# Patient Record
Sex: FEMALE | Race: WHITE | NOT HISPANIC OR LATINO | URBAN - METROPOLITAN AREA
[De-identification: names, ages, dates, MRNs, and addresses within clinical notes are randomized per-mention and may not be internally consistent; named-entity substitution may affect disease eponyms.]

---

## 2017-06-12 ENCOUNTER — EMERGENCY (EMERGENCY)
Facility: HOSPITAL | Age: 82
LOS: 0 days | Discharge: HOME | End: 2017-06-12

## 2017-06-28 DIAGNOSIS — R11.0 NAUSEA: ICD-10-CM

## 2017-06-28 DIAGNOSIS — R63.0 ANOREXIA: ICD-10-CM

## 2017-06-28 DIAGNOSIS — R42 DIZZINESS AND GIDDINESS: ICD-10-CM

## 2017-06-28 DIAGNOSIS — Z79.899 OTHER LONG TERM (CURRENT) DRUG THERAPY: ICD-10-CM

## 2018-09-21 ENCOUNTER — OUTPATIENT (OUTPATIENT)
Dept: OUTPATIENT SERVICES | Facility: HOSPITAL | Age: 83
LOS: 1 days | Discharge: HOME | End: 2018-09-21

## 2018-09-21 DIAGNOSIS — E78.5 HYPERLIPIDEMIA, UNSPECIFIED: ICD-10-CM

## 2018-09-21 DIAGNOSIS — E03.9 HYPOTHYROIDISM, UNSPECIFIED: ICD-10-CM

## 2018-09-21 DIAGNOSIS — E11.65 TYPE 2 DIABETES MELLITUS WITH HYPERGLYCEMIA: ICD-10-CM

## 2019-01-03 ENCOUNTER — EMERGENCY (EMERGENCY)
Facility: HOSPITAL | Age: 84
LOS: 0 days | Discharge: HOME | End: 2019-01-03
Attending: EMERGENCY MEDICINE | Admitting: FAMILY MEDICINE

## 2019-01-03 VITALS
HEART RATE: 77 BPM | SYSTOLIC BLOOD PRESSURE: 186 MMHG | DIASTOLIC BLOOD PRESSURE: 96 MMHG | TEMPERATURE: 96 F | HEIGHT: 59 IN | RESPIRATION RATE: 18 BRPM | WEIGHT: 125 LBS | OXYGEN SATURATION: 96 %

## 2019-01-03 DIAGNOSIS — Z02.9 ENCOUNTER FOR ADMINISTRATIVE EXAMINATIONS, UNSPECIFIED: ICD-10-CM

## 2019-01-03 RX ORDER — IBUPROFEN 200 MG
600 TABLET ORAL ONCE
Qty: 0 | Refills: 0 | Status: COMPLETED | OUTPATIENT
Start: 2019-01-03 | End: 2019-01-03

## 2019-01-03 RX ORDER — ASPIRIN/CALCIUM CARB/MAGNESIUM 324 MG
1 TABLET ORAL
Qty: 0 | Refills: 0 | COMMUNITY

## 2019-01-03 RX ORDER — SIMVASTATIN 20 MG/1
1 TABLET, FILM COATED ORAL
Qty: 0 | Refills: 0 | COMMUNITY

## 2019-01-03 RX ADMIN — Medication 600 MILLIGRAM(S): at 16:39

## 2019-01-03 NOTE — ED PROVIDER NOTE - MEDICAL DECISION MAKING DETAILS
Pathologic fracture. Images reviewed with opt and family, splint placed.  Per family patient had abnml mammo, she did not want to pursue work up.  She has appt with Ortho and will keep appt.

## 2019-01-03 NOTE — ED ADULT NURSE NOTE - NSIMPLEMENTINTERV_GEN_ALL_ED
Implemented All Fall with Harm Risk Interventions:  [place] to call system. Call bell, personal items and telephone within reach. Instruct patient to call for assistance. Room bathroom lighting operational. Non-slip footwear when patient is off stretcher. Physically safe environment: no spills, clutter or unnecessary equipment. Stretcher in lowest position, wheels locked, appropriate side rails in place. Provide visual cue, wrist band, yellow gown, etc. Monitor gait and stability. Monitor for mental status changes and reorient to person, place, and time. Review medications for side effects contributing to fall risk. Reinforce activity limits and safety measures with patient and family. Provide visual clues: red socks.

## 2019-01-03 NOTE — ED PROVIDER NOTE - ATTENDING CONTRIBUTION TO CARE
89 yo F PMHx noted presents with family with c/o left arm pain x weeks.  Pt has x ray by PMD (family has report) that reveal lytic lesion to humerus.  Today pain is worse and patient with difficulty with moving arm.  On exam pt in NAD AAO x 3, + tender with decreased ROM left arm, no skin changes, + distal pulses

## 2019-01-03 NOTE — ED PROVIDER NOTE - CARE PROVIDER_API CALL
Leland Valero (MD), Orthopaedic Surgery  [facility] [place] [address]  Phone: [phone]  Fax: [phone]

## 2019-01-03 NOTE — ED PROVIDER NOTE - OBJECTIVE STATEMENT
89 yo f pmhx sig for lytic lesion in the L humerus being followed by orthopedics reports with arm pain and difficulty flexing at the elbow that began in the AM after pt woke up from sleep. The pain in L humerus is sharp and made worse by AROM and flexion at the elbow improves with keeping arm still. Not associated with numbness or loss of function at the wrist. Denies paresthesias, tingling, trauma, falls.    I have reviewed available current nursing and previous documentation of past medical, surgical, family, and/or social history.

## 2019-01-03 NOTE — ED PROVIDER NOTE - PHYSICAL EXAMINATION
Physical Exam    Vital Signs: I have reviewed the initial vital signs.  Constitutional: well-nourished, appears stated age, no acute distress  Cardiovascular: regular rate, regular rhythm, well-perfused extremities, radial pulse +2 b/l.  Respiratory: unlabored respiratory effort, clear to auscultation bilaterally  Gastrointestinal: soft, non-tender abdomen,   Musculoskeletal: supple neck, no lower extremity edema. +mid L humerus TTP with palpable deformity and difficulty flexing at L elbow.   Integumentary: warm, dry, no rash, no bruise, no abrasions, no lacerations  Neurologic: awake, alert, extremities’ motor and sensory functions grossly intact; radial, ulnar, and median n. intact with no motor sensory loss b/l.  Psychiatric: A&Ox3

## 2019-01-03 NOTE — ED PROVIDER NOTE - NS ED ROS FT
Review of Systems    Constitutional: (-) fever (-) weakness  Eyes: (-) change in vision (-) eye pain  ENT: (-) sore throat  Cardiovascular: (-) chest pain  Respiratory: (-) SOB (-) cough   GI: (-) abdominal pain (-) N/V (-) diarrhea  Integumentary: (-) rash (-) redness   Neurological:  (-) focal deficit (-) altered mental status

## 2019-01-03 NOTE — ED PROVIDER NOTE - NSFOLLOWUPINSTRUCTIONS_ED_ALL_ED_FT
Non-weight bearing fracture    A fracture is a break in one of your bones. This can occur from a variety of injuries especially traumatic ones. Symptoms include pain, bruising, or swelling.  A splint might have been applied by your health care provider. Make sure to keep it dry and follow up with an orthopedist as instructed.    SEEK IMMEDIATE MEDICAL CARE IF YOU HAVE THE FOLLOWING SYMPTOMS: numbness, tingling, pain, or weakness in any part of your body distal to the fracture.    FOLLOW UP WITH ORTHOPEDICS

## 2019-01-05 ENCOUNTER — INPATIENT (INPATIENT)
Facility: HOSPITAL | Age: 84
LOS: 2 days | Discharge: NURSING, REHAB OR LONG-TERM CARE | End: 2019-01-08
Attending: INTERNAL MEDICINE | Admitting: INTERNAL MEDICINE

## 2019-01-05 VITALS
OXYGEN SATURATION: 94 % | RESPIRATION RATE: 18 BRPM | SYSTOLIC BLOOD PRESSURE: 152 MMHG | TEMPERATURE: 97 F | HEIGHT: 59 IN | HEART RATE: 76 BPM | WEIGHT: 139.99 LBS | DIASTOLIC BLOOD PRESSURE: 80 MMHG

## 2019-01-05 LAB
24R-OH-CALCIDIOL SERPL-MCNC: 35 NG/ML — SIGNIFICANT CHANGE UP (ref 30–80)
ALBUMIN SERPL ELPH-MCNC: 3.5 G/DL — SIGNIFICANT CHANGE UP (ref 3.5–5.2)
ALP SERPL-CCNC: 80 U/L — SIGNIFICANT CHANGE UP (ref 30–115)
ALT FLD-CCNC: 14 U/L — SIGNIFICANT CHANGE UP (ref 0–41)
ANION GAP SERPL CALC-SCNC: 15 MMOL/L — HIGH (ref 7–14)
ANION GAP SERPL CALC-SCNC: 8 MMOL/L — SIGNIFICANT CHANGE UP (ref 7–14)
APPEARANCE UR: CLEAR — SIGNIFICANT CHANGE UP
AST SERPL-CCNC: 32 U/L — SIGNIFICANT CHANGE UP (ref 0–41)
BACTERIA # UR AUTO: ABNORMAL
BILIRUB SERPL-MCNC: 1.1 MG/DL — SIGNIFICANT CHANGE UP (ref 0.2–1.2)
BILIRUB UR-MCNC: NEGATIVE — SIGNIFICANT CHANGE UP
BUN SERPL-MCNC: 14 MG/DL — SIGNIFICANT CHANGE UP (ref 10–20)
BUN SERPL-MCNC: 18 MG/DL — SIGNIFICANT CHANGE UP (ref 10–20)
CALCIUM SERPL-MCNC: 9.1 MG/DL — SIGNIFICANT CHANGE UP (ref 8.5–10.1)
CALCIUM SERPL-MCNC: 9.3 MG/DL — SIGNIFICANT CHANGE UP (ref 8.5–10.1)
CHLORIDE SERPL-SCNC: 88 MMOL/L — LOW (ref 98–110)
CHLORIDE SERPL-SCNC: 91 MMOL/L — LOW (ref 98–110)
CO2 SERPL-SCNC: 26 MMOL/L — SIGNIFICANT CHANGE UP (ref 17–32)
CO2 SERPL-SCNC: 33 MMOL/L — HIGH (ref 17–32)
COLOR SPEC: YELLOW — SIGNIFICANT CHANGE UP
CREAT SERPL-MCNC: 0.7 MG/DL — SIGNIFICANT CHANGE UP (ref 0.7–1.5)
CREAT SERPL-MCNC: 0.7 MG/DL — SIGNIFICANT CHANGE UP (ref 0.7–1.5)
DIFF PNL FLD: ABNORMAL
EPI CELLS # UR: ABNORMAL /HPF
GLUCOSE SERPL-MCNC: 153 MG/DL — HIGH (ref 70–99)
GLUCOSE SERPL-MCNC: 213 MG/DL — HIGH (ref 70–99)
GLUCOSE UR QL: NEGATIVE MG/DL — SIGNIFICANT CHANGE UP
HCT VFR BLD CALC: 36.3 % — LOW (ref 37–47)
HGB BLD-MCNC: 12.6 G/DL — SIGNIFICANT CHANGE UP (ref 12–16)
KETONES UR-MCNC: NEGATIVE — SIGNIFICANT CHANGE UP
LEUKOCYTE ESTERASE UR-ACNC: ABNORMAL
MCHC RBC-ENTMCNC: 30.9 PG — SIGNIFICANT CHANGE UP (ref 27–31)
MCHC RBC-ENTMCNC: 34.7 G/DL — SIGNIFICANT CHANGE UP (ref 32–37)
MCV RBC AUTO: 89 FL — SIGNIFICANT CHANGE UP (ref 81–99)
NITRITE UR-MCNC: NEGATIVE — SIGNIFICANT CHANGE UP
NRBC # BLD: 0 /100 WBCS — SIGNIFICANT CHANGE UP (ref 0–0)
PH UR: 6 — SIGNIFICANT CHANGE UP (ref 5–8)
PLATELET # BLD AUTO: 239 K/UL — SIGNIFICANT CHANGE UP (ref 130–400)
POTASSIUM SERPL-MCNC: 3.6 MMOL/L — SIGNIFICANT CHANGE UP (ref 3.5–5)
POTASSIUM SERPL-MCNC: 4.4 MMOL/L — SIGNIFICANT CHANGE UP (ref 3.5–5)
POTASSIUM SERPL-SCNC: 3.6 MMOL/L — SIGNIFICANT CHANGE UP (ref 3.5–5)
POTASSIUM SERPL-SCNC: 4.4 MMOL/L — SIGNIFICANT CHANGE UP (ref 3.5–5)
PROT SERPL-MCNC: 7.1 G/DL — SIGNIFICANT CHANGE UP (ref 6–8)
PROT UR-MCNC: NEGATIVE MG/DL — SIGNIFICANT CHANGE UP
RBC # BLD: 4.08 M/UL — LOW (ref 4.2–5.4)
RBC # FLD: 13.2 % — SIGNIFICANT CHANGE UP (ref 11.5–14.5)
RBC CASTS # UR COMP ASSIST: ABNORMAL /HPF
SODIUM SERPL-SCNC: 129 MMOL/L — LOW (ref 135–146)
SODIUM SERPL-SCNC: 132 MMOL/L — LOW (ref 135–146)
SP GR SPEC: 1.01 — SIGNIFICANT CHANGE UP (ref 1.01–1.03)
TROPONIN T SERPL-MCNC: <0.01 NG/ML — SIGNIFICANT CHANGE UP
UROBILINOGEN FLD QL: 0.2 MG/DL — SIGNIFICANT CHANGE UP (ref 0.2–0.2)
WBC # BLD: 12.82 K/UL — HIGH (ref 4.8–10.8)
WBC # FLD AUTO: 12.82 K/UL — HIGH (ref 4.8–10.8)
WBC UR QL: ABNORMAL /HPF

## 2019-01-05 RX ORDER — MECLIZINE HCL 12.5 MG
25 TABLET ORAL EVERY 8 HOURS
Qty: 0 | Refills: 0 | Status: DISCONTINUED | OUTPATIENT
Start: 2019-01-05 | End: 2019-01-08

## 2019-01-05 RX ORDER — SIMVASTATIN 20 MG/1
40 TABLET, FILM COATED ORAL AT BEDTIME
Qty: 0 | Refills: 0 | Status: DISCONTINUED | OUTPATIENT
Start: 2019-01-05 | End: 2019-01-08

## 2019-01-05 RX ORDER — LOSARTAN POTASSIUM 100 MG/1
50 TABLET, FILM COATED ORAL DAILY
Qty: 0 | Refills: 0 | Status: DISCONTINUED | OUTPATIENT
Start: 2019-01-05 | End: 2019-01-08

## 2019-01-05 RX ORDER — ACETAMINOPHEN 500 MG
650 TABLET ORAL EVERY 6 HOURS
Qty: 0 | Refills: 0 | Status: DISCONTINUED | OUTPATIENT
Start: 2019-01-05 | End: 2019-01-08

## 2019-01-05 RX ORDER — CEFTRIAXONE 500 MG/1
1 INJECTION, POWDER, FOR SOLUTION INTRAMUSCULAR; INTRAVENOUS EVERY 24 HOURS
Qty: 0 | Refills: 0 | Status: DISCONTINUED | OUTPATIENT
Start: 2019-01-05 | End: 2019-01-08

## 2019-01-05 RX ORDER — LANOLIN ALCOHOL/MO/W.PET/CERES
3 CREAM (GRAM) TOPICAL ONCE
Qty: 0 | Refills: 0 | Status: COMPLETED | OUTPATIENT
Start: 2019-01-05 | End: 2019-01-05

## 2019-01-05 RX ORDER — ONDANSETRON 8 MG/1
4 TABLET, FILM COATED ORAL EVERY 6 HOURS
Qty: 0 | Refills: 0 | Status: DISCONTINUED | OUTPATIENT
Start: 2019-01-05 | End: 2019-01-08

## 2019-01-05 RX ORDER — HEPARIN SODIUM 5000 [USP'U]/ML
5000 INJECTION INTRAVENOUS; SUBCUTANEOUS EVERY 12 HOURS
Qty: 0 | Refills: 0 | Status: DISCONTINUED | OUTPATIENT
Start: 2019-01-05 | End: 2019-01-08

## 2019-01-05 RX ORDER — PANTOPRAZOLE SODIUM 20 MG/1
40 TABLET, DELAYED RELEASE ORAL
Qty: 0 | Refills: 0 | Status: DISCONTINUED | OUTPATIENT
Start: 2019-01-05 | End: 2019-01-08

## 2019-01-05 RX ORDER — SODIUM CHLORIDE 9 MG/ML
1000 INJECTION INTRAMUSCULAR; INTRAVENOUS; SUBCUTANEOUS
Qty: 0 | Refills: 0 | Status: DISCONTINUED | OUTPATIENT
Start: 2019-01-05 | End: 2019-01-06

## 2019-01-05 RX ORDER — SODIUM CHLORIDE 9 MG/ML
1000 INJECTION INTRAMUSCULAR; INTRAVENOUS; SUBCUTANEOUS
Qty: 0 | Refills: 0 | Status: DISCONTINUED | OUTPATIENT
Start: 2019-01-05 | End: 2019-01-05

## 2019-01-05 RX ORDER — DIAZEPAM 5 MG
2 TABLET ORAL EVERY 12 HOURS
Qty: 0 | Refills: 0 | Status: DISCONTINUED | OUTPATIENT
Start: 2019-01-05 | End: 2019-01-07

## 2019-01-05 RX ORDER — ASPIRIN/CALCIUM CARB/MAGNESIUM 324 MG
81 TABLET ORAL DAILY
Qty: 0 | Refills: 0 | Status: DISCONTINUED | OUTPATIENT
Start: 2019-01-05 | End: 2019-01-08

## 2019-01-05 RX ADMIN — Medication 2 MILLIGRAM(S): at 17:21

## 2019-01-05 RX ADMIN — SODIUM CHLORIDE 125 MILLILITER(S): 9 INJECTION INTRAMUSCULAR; INTRAVENOUS; SUBCUTANEOUS at 14:58

## 2019-01-05 RX ADMIN — SODIUM CHLORIDE 75 MILLILITER(S): 9 INJECTION INTRAMUSCULAR; INTRAVENOUS; SUBCUTANEOUS at 16:50

## 2019-01-05 RX ADMIN — Medication 25 MILLIGRAM(S): at 23:11

## 2019-01-05 RX ADMIN — SIMVASTATIN 40 MILLIGRAM(S): 20 TABLET, FILM COATED ORAL at 23:11

## 2019-01-05 RX ADMIN — Medication 3 MILLIGRAM(S): at 23:11

## 2019-01-05 RX ADMIN — CEFTRIAXONE 100 GRAM(S): 500 INJECTION, POWDER, FOR SOLUTION INTRAMUSCULAR; INTRAVENOUS at 17:00

## 2019-01-05 RX ADMIN — Medication 25 MILLIGRAM(S): at 17:00

## 2019-01-05 RX ADMIN — HEPARIN SODIUM 5000 UNIT(S): 5000 INJECTION INTRAVENOUS; SUBCUTANEOUS at 17:21

## 2019-01-05 NOTE — ED PROVIDER NOTE - MEDICAL DECISION MAKING DETAILS
89y female h/o chronic vertigo bib daughter for frequent falls, unable to manage at home even with aid, looking for placement, PE as above, labs and studies reviewed, arm resplinted, will admit for placement

## 2019-01-05 NOTE — ED PROVIDER NOTE - NS ED ROS FT
Review of Systems    Constitutional: (-) fever  Eyes/ENT: (-) blurry vision, (-) epistaxis  Cardiovascular: (-) chest pain, (-) syncope  Respiratory: (-) cough, (-) shortness of breath  Gastrointestinal: (-) vomiting, (-) diarrhea  Musculoskeletal: (-) neck pain, (-) back pain, (+) left upper arm pain  Integumentary: (-) rash, (-) edema  Neurological: (-) headache, (-) altered mental status, (+) dizzy  Psychiatric: (-) hallucinations  Allergic/Immunologic: (-) pruritus

## 2019-01-05 NOTE — H&P ADULT - NSHPLABSRESULTS_GEN_ALL_CORE
12.6   12.82 )-----------( 239      ( 2019 11:11 )             36.3           129<L>  |  88<L>  |  18  ----------------------------<  153<H>  4.4   |  33<H>  |  0.7    Ca    9.3      2019 11:11    TPro  7.1  /  Alb  3.5  /  TBili  1.1  /  DBili  x   /  AST  32  /  ALT  14  /  AlkPhos  80          CARDIAC MARKERS ( 2019 11:11 )  x     / <0.01 ng/mL / x     / x     / x          Urinalysis Basic - ( 2019 14:24 )    Color: Yellow / Appearance: Clear / S.015 / pH: x  Gluc: x / Ketone: Negative  / Bili: Negative / Urobilinogen: 0.2 mg/dL   Blood: x / Protein: Negative mg/dL / Nitrite: Negative   Leuk Esterase: Small / RBC: 6-10 /HPF / WBC 10-25 /HPF   Sq Epi: x / Non Sq Epi: Moderate /HPF / Bacteria: Many      X-ray Chest 1 View- PORTABLE-Urgent (19 @ 10:32)     Support devices: None.    Cardiac/mediastinum/hilum: Heart size within normal limits, thoracic   aortic calcification and tortuosity. Right hilar enlargement.    Lung parenchyma/Pleura: Within normal limits.    Skeleton/soft tissues: Displaced comminuted left humeral shaft pathologic   fracture .. Right sixth posterior old rib fracture.. Stable bony   degenerative changes.      Impression:      Right hilar enlargement. Left humeral shaft comminuted pathologic   fracture better visualized on examination of the humerus January 3, 2019.        CT Head No Cont (19 @ 11:25)     1.  No CT evidence of acute intracranial pathology. Stable exam since   2017.    2.  Stable moderate-severe chronic microvascular changes.        X-ray Shoulder 2 Views, Left (19 @ 16:20)     Left humeral shaft lytic lesion with a pathological fracture   and angulation. Left third rib lesion. AC joint degenerative changes.   Greater humeral tuberosity peritendinitis.

## 2019-01-05 NOTE — ED PROVIDER NOTE - OBJECTIVE STATEMENT
hx from daughter, aide  89 yo F hx of vertigo here for feeling dizzy and falling.  Patient has chronic dizziness but has been falling x 2 days. Also c/o left arm pain from humerus fx. Per daughter, patient has abnormal mammo. No CP, HA, abdominal pains, or n/v/d. Patient removed splint to left upper arm

## 2019-01-05 NOTE — H&P ADULT - NSHPPHYSICALEXAM_GEN_ALL_CORE
VITALS:  T(F): 96.7 (01-05-19 @ 09:57), Max: 96.7 (01-05-19 @ 09:57)  HR: 76 (01-05-19 @ 09:57) (76 - 76)  BP: 152/80 (01-05-19 @ 09:57) (152/80 - 152/80)  RR: 18 (01-05-19 @ 09:57) (18 - 18)  SpO2: 94% (01-05-19 @ 09:57) (94% - 94%)    PHYSICAL EXAM:  GENERAL: NAD, frail looking  HEAD:  Atraumatic, Normocephalic  EYES: conjunctiva and sclera clear  ENMT: Moist mucous membranes  NECK: Supple, Normal thyroid  NERVOUS SYSTEM:  Alert & Oriented X 3, Good concentration; Motor Strength 5/5 B/L RUE lower extremities, LUE in cast.  CHEST/LUNG: Clear to auscultation bilaterally; No rales, rhonchi, wheezing, or rubs  HEART: Regular rate and rhythm; No murmurs, rubs, or gallops  ABDOMEN: Soft, Nontender, Nondistended; Bowel sounds present  EXTREMITIES:  2+ Peripheral Pulses, No clubbing, cyanosis, or edema  LYMPH: No lymphadenopathy noted  SKIN: Please see nursing assessment

## 2019-01-05 NOTE — H&P ADULT - NSHPSOCIALHISTORY_GEN_ALL_CORE
Lives at home with family.  denies nay Alcohol, Tobacco or illicit drug use. Lives at home with family.  Denies any Alcohol, Tobacco or illicit drug use.

## 2019-01-05 NOTE — H&P ADULT - NSHPREVIEWOFSYSTEMS_GEN_ALL_CORE
CONSTITUTIONAL: No fever, weight loss, + fatigue  EYES: No eye pain, visual disturbances, or discharge  ENMT:  No difficulty hearing, tinnitus, +++ vertigo; No sinus or throat pain  NECK: No pain or stiffness  BREASTS: No pain, masses, or nipple discharge  RESPIRATORY: No cough, wheezing, chills or hemoptysis; No shortness of breath  CARDIOVASCULAR: No chest pain, palpitations, ++ dizziness, No leg swelling  GASTROINTESTINAL: No abdominal or epigastric pain. No nausea, vomiting, or hematemesis; No diarrhea or constipation. No melena or hematochezia.  GENITOURINARY: No dysuria, frequency, hematuria, or incontinence  NEUROLOGICAL: No headaches, memory loss, loss of strength, numbness, or tremors  SKIN: No itching, burning, rashes, or lesions   LYMPH NODES: No enlarged glands  ENDOCRINE: No heat or cold intolerance; No hair loss  MUSCULOSKELETAL: left upper extremity pain  PSYCHIATRIC: No depression, anxiety, mood swings, or difficulty sleeping  HEME/LYMPH: No easy bruising, or bleeding gums  ALLERGY AND IMMUNOLOGIC: No hives or eczema

## 2019-01-05 NOTE — H&P ADULT - HISTORY OF PRESENT ILLNESS
Patient is a 89 yo Female with h/o HTN, Vertigo, Hyperlipidemia admitted with complaints of worsening vertigo and repeated falls. She was brought in by daughter from home requesting placement. Patient had been in her usual state of health until 2 days ago when her dizziness got very worse resulting in 2 falls. Work up done in the ED showed hyponatremia of 129, UTI and with complaints of left arm pain an x-ray was obtained that showed a Left humeral shaft comminuted pathologic fracture. She was admitted for further management. Patient is a 89 yo Female with h/o colon cancer s/p Resection 3 years ago, recently found breast mass on mammogram, HTN, Vertigo, Hyperlipidemia admitted with complaints of worsening vertigo and repeated falls. She was brought in by daughter from home requesting placement. Patient had been in her usual state of health until 2 days ago when her dizziness got very worse resulting in 2 falls. Work up done in the ED showed hyponatremia of 129, UTI and with complaints of persistent left arm pain an x-ray was obtained which showed a Left humeral shaft comminuted pathologic fracture. She was admitted for further management.

## 2019-01-05 NOTE — ED PROVIDER NOTE - PHYSICAL EXAMINATION
Gen: Alert, NAD, well appearing  Head: NC, AT, PERRL, EOMI, normal lids/conjunctiva  ENT: normal hearing, patent oropharynx   Neck: +supple, no tenderness/meningismus,  Pulm: Bilateral BS, normal resp effort, no wheeze/stridor/retractions  CV: RRR, no murmer  Abd: soft, NT/ND, +BS, no organomegaly  Mskel: +left arm arm swelling, ecchymosis and tenderness. No erythema/cyanosis. n/v intact  Skin: no rash, warm/dry, +ecchymosis LUE  Neuro: AAOx2, no sensory/motor deficits

## 2019-01-05 NOTE — ED ADULT NURSE NOTE - CHIEF COMPLAINT QUOTE
pt fell yesterday and again today  also pt fell last week and had a splint on her lt hand that the pt removed pt fell yesterday and again today  also pt fell last week and had a splint on her lt hand that the pt removed upon inspection of xrays pt has with her the fx is of the humerous

## 2019-01-05 NOTE — ED ADULT TRIAGE NOTE - CHIEF COMPLAINT QUOTE
pt fell yesterday and again today  also pt fell last week and had a splint on her lt hand that the pt removed

## 2019-01-05 NOTE — H&P ADULT - ASSESSMENT
Patient is a 89 yo Female with h/o HTN, Vertigo, Hyperlipidemia admitted with complaints of worsening vertigo and repeated falls. She was brought in by daughter from home requesting placement. Patient had been in her usual state of health until 2 days ago when her dizziness got very worse resulting in 2 falls. Work up done in the ED showed hyponatremia of 129, UTI and with complaints of left arm pain an x-ray was obtained that showed a Left humeral shaft comminuted pathologic fracture. She was admitted for further management.     Assessment and Plan:    1. Dizziness:  History of vertigo Meclizine.   Worsening dizziness could be due to dehydration and hyponatremia.  Continue IV hydration, meclizine and Trial of valium.      2. UTI:  Continue IV Rocephin.  Follow up cultures.      3. Hyponatremia:  Continue IV fluids. Monitor BMP.  Further work up if not correcting.      4. Hypertension:  Hold HCTZ.      5. Pathological Fracture:  U splint applied to the left upper extremity in the ED.  Motor, sensory, and vascular responses intact in the injured extremity.  Will need orthopedic follow up.        DVT prophylaxis: Heparin.  Disposition: Pending PT evaluation. Patient is a 89 yo Female with h/o colon cancer s/p Resection 3 years ago, recently found breast mass on mammogram, HTN, Vertigo, Hyperlipidemia admitted with complaints of worsening vertigo and repeated falls. She was brought in by daughter from home requesting placement. Patient had been in her usual state of health until 2 days ago when her dizziness got very worse resulting in 2 falls. Work up done in the ED showed hyponatremia of 129, UTI and with complaints of persistent left arm pain an x-ray was obtained which showed a Left humeral shaft comminuted pathologic fracture. She was admitted for further management.     Assessment and Plan:    1. Dizziness:  History of vertigo Meclizine.   Worsening dizziness could be due to dehydration and hyponatremia.  Continue IV hydration, meclizine and Trial of valium.      2. UTI:  Continue IV Rocephin.  Follow up cultures.      3. Hyponatremia:   Continue IV fluids. Monitor BMP.  Further work up if not correcting.      4. Hypertension:  Hold HCTZ.      5. Pathological Fracture:  U splint applied to the left upper extremity in the ED.  Motor, sensory, and vascular responses intact in the injured extremity.  Orthopedic: consulted.   Pain control.      6. Left breast mass:  Recent Mammogram done out patient showed a left breast mass.  Patient refuses to have any further work up done.        DVT prophylaxis: Heparin.  Disposition: Pending PT evaluation.

## 2019-01-05 NOTE — ED ADULT TRIAGE NOTE - WEIGHT IN LBS
no loss of consciousness/no nausea/no numbness/no vomiting/no fever/no bleeding gums/no chills/no syncope/no weakness
139.9

## 2019-01-06 DIAGNOSIS — Z90.49 ACQUIRED ABSENCE OF OTHER SPECIFIED PARTS OF DIGESTIVE TRACT: Chronic | ICD-10-CM

## 2019-01-06 PROBLEM — I10 ESSENTIAL (PRIMARY) HYPERTENSION: Chronic | Status: ACTIVE | Noted: 2019-01-03

## 2019-01-06 PROBLEM — E78.00 PURE HYPERCHOLESTEROLEMIA, UNSPECIFIED: Chronic | Status: ACTIVE | Noted: 2019-01-03

## 2019-01-06 LAB
ALBUMIN SERPL ELPH-MCNC: 3.1 G/DL — LOW (ref 3.5–5.2)
ALP SERPL-CCNC: 66 U/L — SIGNIFICANT CHANGE UP (ref 30–115)
ALT FLD-CCNC: 13 U/L — SIGNIFICANT CHANGE UP (ref 0–41)
ANION GAP SERPL CALC-SCNC: 8 MMOL/L — SIGNIFICANT CHANGE UP (ref 7–14)
AST SERPL-CCNC: 23 U/L — SIGNIFICANT CHANGE UP (ref 0–41)
BILIRUB SERPL-MCNC: 0.9 MG/DL — SIGNIFICANT CHANGE UP (ref 0.2–1.2)
BUN SERPL-MCNC: 14 MG/DL — SIGNIFICANT CHANGE UP (ref 10–20)
CALCIUM SERPL-MCNC: 8.9 MG/DL — SIGNIFICANT CHANGE UP (ref 8.5–10.1)
CHLORIDE SERPL-SCNC: 99 MMOL/L — SIGNIFICANT CHANGE UP (ref 98–110)
CO2 SERPL-SCNC: 31 MMOL/L — SIGNIFICANT CHANGE UP (ref 17–32)
CREAT SERPL-MCNC: 0.6 MG/DL — LOW (ref 0.7–1.5)
CULTURE RESULTS: SIGNIFICANT CHANGE UP
GLUCOSE SERPL-MCNC: 101 MG/DL — HIGH (ref 70–99)
MAGNESIUM SERPL-MCNC: 1.8 MG/DL — SIGNIFICANT CHANGE UP (ref 1.8–2.4)
PHOSPHATE SERPL-MCNC: 2.3 MG/DL — SIGNIFICANT CHANGE UP (ref 2.1–4.9)
POTASSIUM SERPL-MCNC: 3.5 MMOL/L — SIGNIFICANT CHANGE UP (ref 3.5–5)
POTASSIUM SERPL-SCNC: 3.5 MMOL/L — SIGNIFICANT CHANGE UP (ref 3.5–5)
PROT SERPL-MCNC: 5.5 G/DL — LOW (ref 6–8)
SODIUM SERPL-SCNC: 138 MMOL/L — SIGNIFICANT CHANGE UP (ref 135–146)
SPECIMEN SOURCE: SIGNIFICANT CHANGE UP

## 2019-01-06 RX ORDER — MORPHINE SULFATE 50 MG/1
2 CAPSULE, EXTENDED RELEASE ORAL ONCE
Qty: 0 | Refills: 0 | Status: DISCONTINUED | OUTPATIENT
Start: 2019-01-06 | End: 2019-01-06

## 2019-01-06 RX ORDER — OXYCODONE AND ACETAMINOPHEN 5; 325 MG/1; MG/1
2 TABLET ORAL EVERY 6 HOURS
Qty: 0 | Refills: 0 | Status: DISCONTINUED | OUTPATIENT
Start: 2019-01-06 | End: 2019-01-08

## 2019-01-06 RX ORDER — POTASSIUM CHLORIDE 20 MEQ
20 PACKET (EA) ORAL ONCE
Qty: 0 | Refills: 0 | Status: COMPLETED | OUTPATIENT
Start: 2019-01-06 | End: 2019-01-06

## 2019-01-06 RX ADMIN — PANTOPRAZOLE SODIUM 40 MILLIGRAM(S): 20 TABLET, DELAYED RELEASE ORAL at 06:19

## 2019-01-06 RX ADMIN — OXYCODONE AND ACETAMINOPHEN 2 TABLET(S): 5; 325 TABLET ORAL at 22:10

## 2019-01-06 RX ADMIN — HEPARIN SODIUM 5000 UNIT(S): 5000 INJECTION INTRAVENOUS; SUBCUTANEOUS at 06:19

## 2019-01-06 RX ADMIN — OXYCODONE AND ACETAMINOPHEN 2 TABLET(S): 5; 325 TABLET ORAL at 21:40

## 2019-01-06 RX ADMIN — Medication 20 MILLIEQUIVALENT(S): at 17:39

## 2019-01-06 RX ADMIN — Medication 325 MILLIGRAM(S): at 03:48

## 2019-01-06 RX ADMIN — HEPARIN SODIUM 5000 UNIT(S): 5000 INJECTION INTRAVENOUS; SUBCUTANEOUS at 17:39

## 2019-01-06 RX ADMIN — CEFTRIAXONE 100 GRAM(S): 500 INJECTION, POWDER, FOR SOLUTION INTRAMUSCULAR; INTRAVENOUS at 17:43

## 2019-01-06 RX ADMIN — Medication 25 MILLIGRAM(S): at 21:40

## 2019-01-06 RX ADMIN — LOSARTAN POTASSIUM 50 MILLIGRAM(S): 100 TABLET, FILM COATED ORAL at 06:19

## 2019-01-06 RX ADMIN — Medication 25 MILLIGRAM(S): at 14:38

## 2019-01-06 RX ADMIN — MORPHINE SULFATE 2 MILLIGRAM(S): 50 CAPSULE, EXTENDED RELEASE ORAL at 12:47

## 2019-01-06 RX ADMIN — OXYCODONE AND ACETAMINOPHEN 2 TABLET(S): 5; 325 TABLET ORAL at 14:25

## 2019-01-06 RX ADMIN — SIMVASTATIN 40 MILLIGRAM(S): 20 TABLET, FILM COATED ORAL at 21:40

## 2019-01-06 RX ADMIN — Medication 650 MILLIGRAM(S): at 04:08

## 2019-01-06 RX ADMIN — Medication 25 MILLIGRAM(S): at 06:19

## 2019-01-06 RX ADMIN — Medication 2 MILLIGRAM(S): at 17:43

## 2019-01-06 RX ADMIN — Medication 2 MILLIGRAM(S): at 06:19

## 2019-01-06 RX ADMIN — Medication 81 MILLIGRAM(S): at 14:38

## 2019-01-06 NOTE — PROGRESS NOTE ADULT - ASSESSMENT
Patient is a 89 yo Female with h/o colon cancer s/p Resection 3 years ago, recently found breast mass on mammogram, HTN, Vertigo, Hyperlipidemia admitted with complaints of worsening vertigo and repeated falls. She was brought in by daughter from home requesting placement. Patient had been in her usual state of health until 2 days ago when her dizziness got very worse resulting in 2 falls. Work up done in the ED showed hyponatremia of 129, UTI and with complaints of persistent left arm pain an x-ray was obtained which showed a Left humeral shaft comminuted pathologic fracture. She was admitted for further management.     Assessment and Plan:    1. Dizziness:  History of vertigo on Meclizine.   Worsening dizziness most likely due to dehydration and hyponatremia.  Continue IV hydration, meclizine and valium. Neurology consult if worsening.      2. UTI:  Continue antibiotics.  Follow up cultures.      3. Hyponatremia: Resolved.  Encourage PO intake. Monitor BMP.        4. Hypertension:  Hold HCTZ. Continue ARB.  Monitor BP.      5. Pathological Fracture:  U splint applied to the left upper extremity in the ED.  Motor, sensory, and vascular responses intact in the injured extremity.  Orthopedic consulted.   Pain control.      6. Left breast mass:  Recent Mammogram done out patient showed a left breast mass.  Patient refuses to have any further work up done.        DVT prophylaxis: Heparin.  Disposition: Pending PT evaluation.

## 2019-01-06 NOTE — CONSULT NOTE ADULT - SUBJECTIVE AND OBJECTIVE BOX
HPI:  Patient is a 89 yo Female with h/o HTN, Vertigo, Hyperlipidemia admitted with complaints of worsening vertigo and repeated falls. She was brought in by daughter from home requesting placement. Patient had been in her usual state of health until 2 days ago when her dizziness got very worse resulting in 2 falls. Work up done in the ED showed hyponatremia of 129, UTI and with complaints of left arm pain an x-ray was obtained that showed a Left humeral shaft comminuted pathologic fracture. She was admitted for further management.   PTN  REFERRED TO ACUTE  REHAB  FOR  EVAL AND  TX   PAST MEDICAL & SURGICAL HISTORY:  Vertigo  High cholesterol  HTN (hypertension)  No significant past surgical history      Hospital Course:    TODAY'S SUBJECTIVE & REVIEW OF SYMPTOMS:     Constitutional WNL   Cardio WNL   Resp WNL   GI WNL  Heme WNL  Endo WNL  Skin WNL  MSK WNL  Neuro WNL  Cognitive WNL  Psych WNL      MEDICATIONS  (STANDING):  aspirin  chewable 81 milliGRAM(s) Oral daily  cefTRIAXone   IVPB 1 Gram(s) IV Intermittent every 24 hours  diazepam    Tablet 2 milliGRAM(s) Oral every 12 hours  heparin  Injectable 5000 Unit(s) SubCutaneous every 12 hours  losartan 50 milliGRAM(s) Oral daily  meclizine 25 milliGRAM(s) Oral every 8 hours  pantoprazole    Tablet 40 milliGRAM(s) Oral before breakfast  simvastatin 40 milliGRAM(s) Oral at bedtime  sodium chloride 0.9%. 1000 milliLiter(s) (75 mL/Hr) IV Continuous <Continuous>    MEDICATIONS  (PRN):  acetaminophen   Tablet .. 650 milliGRAM(s) Oral every 6 hours PRN Temp greater or equal to 38C (100.4F), Moderate Pain (4 - 6)  ondansetron Injectable 4 milliGRAM(s) IV Push every 6 hours PRN Nausea and/or Vomiting  oxyCODONE    5 mG/acetaminophen 325 mG 2 Tablet(s) Oral every 6 hours PRN Severe Pain (7 - 10)      FAMILY HISTORY:      Allergies    No Known Allergies    Intolerances        SOCIAL HISTORY:    [  ] Etoh  [  ] Smoking  [  ] Substance abuse     Home Environment:  [  ] Home Alone  [ x ] Lives with Family  [  ] Home Health Aid    Dwelling:  [  ] Apartment  [ x ] Private House  [  ] Adult Home  [  ] Skilled Nursing Facility      [  ] Short Term  [  ] Long Term  [  x] Stairs       Elevator [  ]    FUNCTIONAL STATUS PTA: (Check all that apply)  Ambulation: [ x  ]Independent    [  ] Dependent     [  ] Non-Ambulatory  Assistive Device: [  ] SA Cane  [  ]  Q Cane  [ x ] Walker  [  ]  Wheelchair  ADL : [  x] Independent  [  ]  Dependent       Vital Signs Last 24 Hrs  T(C): 37.1 (2019 05:11), Max: 37.1 (2019 05:11)  T(F): 98.8 (2019 05:11), Max: 98.8 (2019 05:11)  HR: 83 (2019 05:11) (83 - 87)  BP: 141/72 (2019 05:11) (136/65 - 150/67)  BP(mean): --  RR: 16 (2019 05:11) (16 - 16)  SpO2: 97% (2019 19:49) (97% - 97%)      PHYSICAL EXAM: Alert & Oriented X3  GENERAL: NAD, well-groomed, well-developed  HEAD:  Atraumatic, Normocephalic  EYES: EOMI, PERRLA, conjunctiva and sclera clear  NECK: Supple, No JVD, Normal thyroid  CHEST/LUNG: Clear to percussion bilaterally; No rales, rhonchi, wheezing, or rubs  HEART: Regular rate and rhythm; No murmurs, rubs, or gallops  ABDOMEN: Soft, Nontender, Nondistended; Bowel sounds present  EXTREMITIES:  2+ Peripheral Pulses, No clubbing, cyanosis, or edema    NERVOUS SYSTEM:  Cranial Nerves 2-12 intact [ x ] Abnormal  [  ]  ROM: WFL all extremities [  ]  Abnormal [ x ]  Motor Strength: WFL all extremities  [  ]  Abnormal [ x ]  Sensation: intact to light touch [  ] Abnormal [ x ]  Reflexes: Symmetric [  ]  Abnormal [x  ]    FUNCTIONAL STATUS:  Bed Mobility: Independent [  ]  Supervision [  ]  Needs Assistance [ x ]  N/A [  ]  Transfers: Independent [  ]  Supervision [  ]  Needs Assistance [ x ]  N/A [  ]   Ambulation: Independent [  ]  Supervision [  ]  Needs Assistance [x ]  N/A [  ]  ADL: Independent [  ] Requires Assistance [ x ] N/A [  ]  SEE PT/ OT IE NOTES    LABS:                        12.6   12.82 )-----------( 239      ( 2019 11:11 )             36.3     01-06    138  |  99  |  14  ----------------------------<  101<H>  3.5   |  31  |  0.6<L>    Ca    8.9      2019 07:58  Phos  2.3       Mg     1.8         TPro  5.5<L>  /  Alb  3.1<L>  /  TBili  0.9  /  DBili  x   /  AST  23  /  ALT  13  /  AlkPhos  66        Urinalysis Basic - ( 2019 14:24 )  < from: Xray Shoulder 2 Views, Left (19 @ 16:20) >  Findings/  impression: Left humeral shaft lytic lesion with a pathological fracture   and angulation. Left third rib lesion.. AC joint degenerative changes.   Greater humeral tuberosity peritendinitis.      < end of copied text >    Color: Yellow / Appearance: Clear / S.015 / pH: x  Gluc: x / Ketone: Negative  / Bili: Negative / Urobili: 0.2 mg/dL   Blood: x / Protein: Negative mg/dL / Nitrite: Negative   Leuk Esterase: Small / RBC: 6-10 /HPF / WBC 10-25 /HPF   Sq Epi: x / Non Sq Epi: Moderate /HPF / Bacteria: Many        RADIOLOGY & ADDITIONAL STUDIES:    Assesment:

## 2019-01-06 NOTE — PROGRESS NOTE ADULT - SUBJECTIVE AND OBJECTIVE BOX
SEBASTIAN VIDA  89y  Female    Patient is a 89y old  Female who presents with a chief complaint of Dizziness (2019 13:09)      INTERVAL HPI/OVERNIGHT EVENTS:  No interval events.  Patient complaining of severe left Upper extremity Pain.   Dizziness improved.      REVIEW OF SYSTEMS:  CONSTITUTIONAL: No fever, weight loss, or fatigue  EYES: No eye pain, visual disturbances, or discharge  ENMT:  No difficulty hearing, tinnitus, vertigo; No sinus or throat pain  NECK: No pain or stiffness  BREASTS: No pain, masses, or nipple discharge  RESPIRATORY: No cough, wheezing, chills or hemoptysis; No shortness of breath  CARDIOVASCULAR: No chest pain, palpitations, + dizziness, No leg swelling  GASTROINTESTINAL: No abdominal or epigastric pain. No nausea, vomiting, or hematemesis; No diarrhea or constipation. No melena or hematochezia.  GENITOURINARY: No dysuria, frequency, hematuria, or incontinence  NEUROLOGICAL: No headaches, memory loss, loss of strength, numbness, or tremors  SKIN: No itching, burning, rashes, or lesions   MUSCULOSKELETAL: Left upper extremity pain  PSYCHIATRIC: No depression, anxiety, mood swings, or difficulty sleeping  HEME/LYMPH: No easy bruising, or bleeding gums  ALLERGY AND IMMUNOLOGIC: No hives or eczema      VITALS:  T(F): 97.5 (19 @ 13:40), Max: 98.8 (19 @ 05:11)  HR: 78 (19 @ 13:40) (78 - 87)  BP: 115/85 (19 @ 13:40) (115/85 - 150/67)  RR: 18 (19 @ 13:40) (16 - 18)  SpO2: 97% (19 @ 19:49) (97% - 97%)      PHYSICAL EXAM:  GENERAL: NAD  HEAD:  Atraumatic, Normocephalic  EYES: conjunctiva and sclera clear  ENMT: Moist mucous membranes  NECK: Supple, Normal thyroid  NERVOUS SYSTEM:  Alert & Oriented X 3, Good concentration; Motor Strength 5/5 B/L upper and lower extremities  CHEST/LUNG: Clear to auscultation bilaterally; No rales, rhonchi, wheezing, or rubs  HEART: Regular rate and rhythm; No murmurs, rubs, or gallops  ABDOMEN: Soft, Nontender, Nondistended; Bowel sounds present  EXTREMITIES:  2+ Peripheral Pulses, No clubbing, cyanosis, or edema. LUE in cast. Neurovascular intact.  LYMPH: No lymphadenopathy noted  SKIN: No rashes or lesions    Consultant(s) Notes Reviewed:  [x ] YES  [ ] NO  Care Discussed with Consultants/Other Providers [ x] YES  [ ] NO    LABS:                        12.6   12.82 )-----------( 239      ( 2019 11:11 )             36.3     -    138  |  99  |  14  ----------------------------<  101<H>  3.5   |  31  |  0.6<L>    Ca    8.9      2019 07:58  Phos  2.3       Mg     1.8         TPro  5.5<L>  /  Alb  3.1<L>  /  TBili  0.9  /  DBili  x   /  AST  23  /  ALT  13  /  AlkPhos  66        Urinalysis Basic - ( 2019 14:24 )    Color: Yellow / Appearance: Clear / S.015 / pH: x  Gluc: x / Ketone: Negative  / Bili: Negative / Urobili: 0.2 mg/dL   Blood: x / Protein: Negative mg/dL / Nitrite: Negative   Leuk Esterase: Small / RBC: 6-10 /HPF / WBC 10-25 /HPF   Sq Epi: x / Non Sq Epi: Moderate /HPF / Bacteria: Many      MICROBIOLOGY: pending      RADIOLOGY & ADDITIONAL TESTS:  	X-ray Chest 1 View- PORTABLE-Urgent (19 @ 10:32)     	Support devices: None.    	Cardiac/mediastinum/hilum: Heart size within normal limits, thoracic   	aortic calcification and tortuosity. Right hilar enlargement.    	Lung parenchyma/Pleura: Within normal limits.    	Skeleton/soft tissues: Displaced comminuted left humeral shaft pathologic   	fracture .. Right sixth posterior old rib fracture.. Stable bony   	degenerative changes.      	Impression:      	Right hilar enlargement. Left humeral shaft comminuted pathologic   	fracture better visualized on examination of the humerus January 3, 2019.        	CT Head No Cont (19 @ 11:25)     	1.  No CT evidence of acute intracranial pathology. Stable exam since   	2017.    	2.  Stable moderate-severe chronic microvascular changes.        	X-ray Shoulder 2 Views, Left (19 @ 16:20)     	Left humeral shaft lytic lesion with a pathological fracture   	and angulation. Left third rib lesion. AC joint degenerative changes.   Greater humeral tuberosity peritendinitis.      Imaging Personally Reviewed:  [x] YES  [ ] NO    MEDICATIONS  (STANDING):  aspirin  chewable 81 milliGRAM(s) Oral daily  cefTRIAXone   IVPB 1 Gram(s) IV Intermittent every 24 hours  diazepam    Tablet 2 milliGRAM(s) Oral every 12 hours  heparin  Injectable 5000 Unit(s) SubCutaneous every 12 hours  losartan 50 milliGRAM(s) Oral daily  meclizine 25 milliGRAM(s) Oral every 8 hours  pantoprazole    Tablet 40 milliGRAM(s) Oral before breakfast  simvastatin 40 milliGRAM(s) Oral at bedtime    MEDICATIONS  (PRN):  acetaminophen   Tablet .. 650 milliGRAM(s) Oral every 6 hours PRN Temp greater or equal to 38C (100.4F), Moderate Pain (4 - 6)  ondansetron Injectable 4 milliGRAM(s) IV Push every 6 hours PRN Nausea and/or Vomiting  oxyCODONE    5 mG/acetaminophen 325 mG 2 Tablet(s) Oral every 6 hours PRN Severe Pain (7 - 10)      HEALTH ISSUES - PROBLEM Dx:  Left breast mass (Abnormal Mammogram)  h/o Colon cancer s/p Resection  Vertigo  High cholesterol  HTN (hypertension)

## 2019-01-07 LAB
ALBUMIN SERPL ELPH-MCNC: 3 G/DL — LOW (ref 3.5–5.2)
ALP SERPL-CCNC: 82 U/L — SIGNIFICANT CHANGE UP (ref 30–115)
ALT FLD-CCNC: 22 U/L — SIGNIFICANT CHANGE UP (ref 0–41)
ANION GAP SERPL CALC-SCNC: 9 MMOL/L — SIGNIFICANT CHANGE UP (ref 7–14)
AST SERPL-CCNC: 34 U/L — SIGNIFICANT CHANGE UP (ref 0–41)
BILIRUB SERPL-MCNC: 0.8 MG/DL — SIGNIFICANT CHANGE UP (ref 0.2–1.2)
BUN SERPL-MCNC: 15 MG/DL — SIGNIFICANT CHANGE UP (ref 10–20)
CALCIUM SERPL-MCNC: 9.1 MG/DL — SIGNIFICANT CHANGE UP (ref 8.5–10.1)
CHLORIDE SERPL-SCNC: 98 MMOL/L — SIGNIFICANT CHANGE UP (ref 98–110)
CO2 SERPL-SCNC: 29 MMOL/L — SIGNIFICANT CHANGE UP (ref 17–32)
CREAT SERPL-MCNC: 0.6 MG/DL — LOW (ref 0.7–1.5)
GLUCOSE SERPL-MCNC: 107 MG/DL — HIGH (ref 70–99)
HCT VFR BLD CALC: 35.9 % — LOW (ref 37–47)
HGB BLD-MCNC: 12 G/DL — SIGNIFICANT CHANGE UP (ref 12–16)
MAGNESIUM SERPL-MCNC: 1.7 MG/DL — LOW (ref 1.8–2.4)
MCHC RBC-ENTMCNC: 30.3 PG — SIGNIFICANT CHANGE UP (ref 27–31)
MCHC RBC-ENTMCNC: 33.4 G/DL — SIGNIFICANT CHANGE UP (ref 32–37)
MCV RBC AUTO: 90.7 FL — SIGNIFICANT CHANGE UP (ref 81–99)
NRBC # BLD: 0 /100 WBCS — SIGNIFICANT CHANGE UP (ref 0–0)
PHOSPHATE SERPL-MCNC: 2.7 MG/DL — SIGNIFICANT CHANGE UP (ref 2.1–4.9)
PLATELET # BLD AUTO: 265 K/UL — SIGNIFICANT CHANGE UP (ref 130–400)
POTASSIUM SERPL-MCNC: 4 MMOL/L — SIGNIFICANT CHANGE UP (ref 3.5–5)
POTASSIUM SERPL-SCNC: 4 MMOL/L — SIGNIFICANT CHANGE UP (ref 3.5–5)
PROT SERPL-MCNC: 6.1 G/DL — SIGNIFICANT CHANGE UP (ref 6–8)
RBC # BLD: 3.96 M/UL — LOW (ref 4.2–5.4)
RBC # FLD: 13.3 % — SIGNIFICANT CHANGE UP (ref 11.5–14.5)
SODIUM SERPL-SCNC: 136 MMOL/L — SIGNIFICANT CHANGE UP (ref 135–146)
WBC # BLD: 9.21 K/UL — SIGNIFICANT CHANGE UP (ref 4.8–10.8)
WBC # FLD AUTO: 9.21 K/UL — SIGNIFICANT CHANGE UP (ref 4.8–10.8)

## 2019-01-07 RX ORDER — MAGNESIUM SULFATE 500 MG/ML
2 VIAL (ML) INJECTION ONCE
Qty: 0 | Refills: 0 | Status: COMPLETED | OUTPATIENT
Start: 2019-01-07 | End: 2019-01-07

## 2019-01-07 RX ORDER — LANOLIN ALCOHOL/MO/W.PET/CERES
3 CREAM (GRAM) TOPICAL AT BEDTIME
Qty: 0 | Refills: 0 | Status: DISCONTINUED | OUTPATIENT
Start: 2019-01-07 | End: 2019-01-08

## 2019-01-07 RX ORDER — MORPHINE SULFATE 50 MG/1
2 CAPSULE, EXTENDED RELEASE ORAL EVERY 6 HOURS
Qty: 0 | Refills: 0 | Status: DISCONTINUED | OUTPATIENT
Start: 2019-01-07 | End: 2019-01-08

## 2019-01-07 RX ADMIN — Medication 50 GRAM(S): at 17:42

## 2019-01-07 RX ADMIN — PANTOPRAZOLE SODIUM 40 MILLIGRAM(S): 20 TABLET, DELAYED RELEASE ORAL at 05:00

## 2019-01-07 RX ADMIN — OXYCODONE AND ACETAMINOPHEN 2 TABLET(S): 5; 325 TABLET ORAL at 08:31

## 2019-01-07 RX ADMIN — Medication 25 MILLIGRAM(S): at 13:40

## 2019-01-07 RX ADMIN — MORPHINE SULFATE 2 MILLIGRAM(S): 50 CAPSULE, EXTENDED RELEASE ORAL at 16:54

## 2019-01-07 RX ADMIN — Medication 25 MILLIGRAM(S): at 21:08

## 2019-01-07 RX ADMIN — Medication 2 MILLIGRAM(S): at 05:00

## 2019-01-07 RX ADMIN — Medication 25 MILLIGRAM(S): at 05:00

## 2019-01-07 RX ADMIN — OXYCODONE AND ACETAMINOPHEN 2 TABLET(S): 5; 325 TABLET ORAL at 09:00

## 2019-01-07 RX ADMIN — HEPARIN SODIUM 5000 UNIT(S): 5000 INJECTION INTRAVENOUS; SUBCUTANEOUS at 17:42

## 2019-01-07 RX ADMIN — MORPHINE SULFATE 2 MILLIGRAM(S): 50 CAPSULE, EXTENDED RELEASE ORAL at 16:34

## 2019-01-07 RX ADMIN — SIMVASTATIN 40 MILLIGRAM(S): 20 TABLET, FILM COATED ORAL at 21:08

## 2019-01-07 RX ADMIN — HEPARIN SODIUM 5000 UNIT(S): 5000 INJECTION INTRAVENOUS; SUBCUTANEOUS at 05:00

## 2019-01-07 RX ADMIN — LOSARTAN POTASSIUM 50 MILLIGRAM(S): 100 TABLET, FILM COATED ORAL at 05:00

## 2019-01-07 RX ADMIN — CEFTRIAXONE 100 GRAM(S): 500 INJECTION, POWDER, FOR SOLUTION INTRAMUSCULAR; INTRAVENOUS at 18:35

## 2019-01-07 RX ADMIN — Medication 81 MILLIGRAM(S): at 11:26

## 2019-01-07 NOTE — PHYSICAL THERAPY INITIAL EVALUATION ADULT - GENERAL OBSERVATIONS, REHAB EVAL
13:50 - 14:14.  Chart reviewed. Patient available to be seen for physical therapy, confirmed with nurse. Patient encountered semi-reclined in bed, +ace wrap/splint LUE.  Patient would like to get OOB to chair.

## 2019-01-07 NOTE — PROGRESS NOTE ADULT - ASSESSMENT
Patient is a 89 yo Female with h/o colon cancer s/p Resection 3 years ago, recently found breast mass on mammogram, HTN, Vertigo, Hyperlipidemia admitted with complaints of worsening vertigo and repeated falls. She was brought in by daughter from home requesting placement. Patient had been in her usual state of health until 2 days ago when her dizziness got very worse resulting in 2 falls. Work up done in the ED showed hyponatremia of 129, UTI and with complaints of persistent left arm pain an x-ray was obtained which showed a Left humeral shaft comminuted pathologic fracture. She was admitted for further management.     Assessment and Plan:    1. Dizziness:  History of vertigo on Meclizine.   Worsening dizziness most likely due to dehydration and hyponatremia.  Continue Meclizine and Valium.       2. UTI:  Complete Antibiotics.      3. Hyponatremia: Resolved.  Encourage PO intake. Monitor BMP.        4. Hypertension:  Hold HCTZ. Continue ARB.  Monitor BP.      5. Left humeral Pathological Fracture:  U splint applied to the left upper extremity in the ED.  Motor, sensory, and vascular responses intact in the injured extremity.  Orthopedic consulted.   Pain control.      6. Left breast mass:  Recent Mammogram done out patient showed a left breast mass.  Patient refuses to have any further work up done.        DVT prophylaxis: Heparin.  Disposition: STR in a NH recommended.

## 2019-01-07 NOTE — CONSULT NOTE ADULT - SUBJECTIVE AND OBJECTIVE BOX
89 y/o woman s/p fall with pain and pathologic fx to left humerus  N/V intact   some hand swelling   in splint  x-rays show lytic lesion  hx dizziness several recent falls  PMH   left breast mass   s/p colon resection for Ca vertigo htn  meds   asa, meclizine, losartin,  pantoprazole, simvistatin, tylenol and percocet    also on ABS for UTI   WBC  12   H/H 12.6/36.3    IMP: pathologic fx left humerus   likely breast      manage pain   elevate hand         would change to humeral fx brace in 7-10 days could be done at [facility]         consider radiation oncology for isolated lesion  and discuss with family interest in a more extensive work up such as a bone scan         will discuss treatment  plan with daughter

## 2019-01-07 NOTE — PHYSICAL THERAPY INITIAL EVALUATION ADULT - GAIT DEVIATIONS NOTED, PT EVAL
decreased weight-shifting ability/decreased step length/increased time in double stance/decreased liliana

## 2019-01-07 NOTE — PROGRESS NOTE ADULT - SUBJECTIVE AND OBJECTIVE BOX
SEBASTIAN [facility]  89y  Female    Patient is a 89y old  Female who presents with a chief complaint of Dizziness (2019 13:09)      INTERVAL HPI/OVERNIGHT EVENTS:  No interval events.  Patient complaining of severe left Upper extremity Pain.   Dizziness improved.      REVIEW OF SYSTEMS:  CONSTITUTIONAL: No fever, weight loss, or fatigue  EYES: No eye pain, visual disturbances, or discharge  ENMT:  No difficulty hearing, tinnitus, vertigo; No sinus or throat pain  NECK: No pain or stiffness  BREASTS: No pain, masses, or nipple discharge  RESPIRATORY: No cough, wheezing, chills or hemoptysis; No shortness of breath  CARDIOVASCULAR: No chest pain, palpitations, + dizziness, No leg swelling  GASTROINTESTINAL: No abdominal or epigastric pain. No nausea, vomiting, or hematemesis; No diarrhea or constipation. No melena or hematochezia.  GENITOURINARY: No dysuria, frequency, hematuria, or incontinence  NEUROLOGICAL: No headaches, memory loss, loss of strength, numbness, or tremors  SKIN: No itching, burning, rashes, or lesions   MUSCULOSKELETAL: Left upper extremity pain  PSYCHIATRIC: No depression, anxiety, mood swings, or difficulty sleeping  HEME/LYMPH: No easy bruising, or bleeding gums  ALLERGY AND IMMUNOLOGIC: No hives or eczema      VITALS:  T(C): 36.4 (2019 05:33), Max: 36.4 (2019 13:40)  T(F): 97.5 (2019 05:33), Max: 97.5 (2019 13:40)  HR: 74 (2019 05:33) (74 - 78)  BP: 167/67 (2019 22:09) (115/85 - 167/67)  BP(mean): 61 (2019 05:33) (61 - 61)  RR: 16 (2019 05:33) (16 - 18)  SpO2: --      PHYSICAL EXAM:  GENERAL: NAD  HEAD:  Atraumatic, Normocephalic  EYES: conjunctiva and sclera clear  ENMT: Moist mucous membranes  NECK: Supple, Normal thyroid  NERVOUS SYSTEM:  Alert & Oriented X 3, Good concentration; Motor Strength 5/5 B/L upper and lower extremities  CHEST/LUNG: Clear to auscultation bilaterally; No rales, rhonchi, wheezing, or rubs  HEART: Regular rate and rhythm; No murmurs, rubs, or gallops  ABDOMEN: Soft, Nontender, Nondistended; Bowel sounds present  EXTREMITIES:  2+ Peripheral Pulses, No clubbing, cyanosis, or edema. LUE in cast. Neurovascular intact.  LYMPH: No lymphadenopathy noted  SKIN: No rashes or lesions    Consultant(s) Notes Reviewed:  [x ] YES  [ ] NO  Care Discussed with Consultants/Other Providers [ x] YES  [ ] NO    LABS:                                   12.6   12.82 )-----------( 239      ( 2019 11:11 )             36.3     01-06    138  |  99  |  14  ----------------------------<  101<H>  3.5   |  31  |  0.6<L>    Ca    8.9      2019 07:58  Phos  2.3     -  Mg     1.8         TPro  5.5<L>  /  Alb  3.1<L>  /  TBili  0.9  /  DBili  x   /  AST  23  /  ALT  13  /  AlkPhos  66          Urinalysis Basic - ( 2019 14:24 )    Color: Yellow / Appearance: Clear / S.015 / pH: x  Gluc: x / Ketone: Negative  / Bili: Negative / Urobili: 0.2 mg/dL   Blood: x / Protein: Negative mg/dL / Nitrite: Negative   Leuk Esterase: Small / RBC: 6-10 /HPF / WBC 10-25 /HPF   Sq Epi: x / Non Sq Epi: Moderate /HPF / Bacteria: Many      MICROBIOLOGY: pending      RADIOLOGY & ADDITIONAL TESTS:  	X-ray Chest 1 View- PORTABLE-Urgent (19 @ 10:32)     	Support devices: None.    	Cardiac/mediastinum/hilum: Heart size within normal limits, thoracic   	aortic calcification and tortuosity. Right hilar enlargement.    	Lung parenchyma/Pleura: Within normal limits.    	Skeleton/soft tissues: Displaced comminuted left humeral shaft pathologic   	fracture .. Right sixth posterior old rib fracture.. Stable bony   	degenerative changes.      	Impression:      	Right hilar enlargement. Left humeral shaft comminuted pathologic   	fracture better visualized on examination of the humerus January 3, 2019.        	CT Head No Cont (19 @ 11:25)     	1.  No CT evidence of acute intracranial pathology. Stable exam since   	2017.    	2.  Stable moderate-severe chronic microvascular changes.        	X-ray Shoulder 2 Views, Left (19 @ 16:20)     	Left humeral shaft lytic lesion with a pathological fracture   	and angulation. Left third rib lesion. AC joint degenerative changes.   Greater humeral tuberosity peritendinitis.      Imaging Personally Reviewed:  [x] YES  [ ] NO    MEDICATIONS  (STANDING):  aspirin  chewable 81 milliGRAM(s) Oral daily  cefTRIAXone   IVPB 1 Gram(s) IV Intermittent every 24 hours  diazepam    Tablet 2 milliGRAM(s) Oral every 12 hours  heparin  Injectable 5000 Unit(s) SubCutaneous every 12 hours  losartan 50 milliGRAM(s) Oral daily  meclizine 25 milliGRAM(s) Oral every 8 hours  pantoprazole    Tablet 40 milliGRAM(s) Oral before breakfast  simvastatin 40 milliGRAM(s) Oral at bedtime    MEDICATIONS  (PRN):  acetaminophen   Tablet .. 650 milliGRAM(s) Oral every 6 hours PRN Temp greater or equal to 38C (100.4F), Moderate Pain (4 - 6)  ondansetron Injectable 4 milliGRAM(s) IV Push every 6 hours PRN Nausea and/or Vomiting  oxyCODONE    5 mG/acetaminophen 325 mG 2 Tablet(s) Oral every 6 hours PRN Severe Pain (7 - 10)      HEALTH ISSUES - PROBLEM Dx:  Left breast mass (Abnormal Mammogram)  h/o Colon cancer s/p Resection  Vertigo  High cholesterol  HTN (hypertension)

## 2019-01-08 ENCOUNTER — TRANSCRIPTION ENCOUNTER (OUTPATIENT)
Age: 84
End: 2019-01-08

## 2019-01-08 VITALS
TEMPERATURE: 98 F | HEART RATE: 75 BPM | RESPIRATION RATE: 16 BRPM | SYSTOLIC BLOOD PRESSURE: 105 MMHG | DIASTOLIC BLOOD PRESSURE: 83 MMHG

## 2019-01-08 LAB
ALBUMIN SERPL ELPH-MCNC: 3 G/DL — LOW (ref 3.5–5.2)
ALP SERPL-CCNC: 85 U/L — SIGNIFICANT CHANGE UP (ref 30–115)
ALT FLD-CCNC: 25 U/L — SIGNIFICANT CHANGE UP (ref 0–41)
ANION GAP SERPL CALC-SCNC: 8 MMOL/L — SIGNIFICANT CHANGE UP (ref 7–14)
AST SERPL-CCNC: 33 U/L — SIGNIFICANT CHANGE UP (ref 0–41)
BILIRUB SERPL-MCNC: 0.8 MG/DL — SIGNIFICANT CHANGE UP (ref 0.2–1.2)
BUN SERPL-MCNC: 13 MG/DL — SIGNIFICANT CHANGE UP (ref 10–20)
CALCIUM SERPL-MCNC: 9 MG/DL — SIGNIFICANT CHANGE UP (ref 8.5–10.1)
CHLORIDE SERPL-SCNC: 98 MMOL/L — SIGNIFICANT CHANGE UP (ref 98–110)
CO2 SERPL-SCNC: 31 MMOL/L — SIGNIFICANT CHANGE UP (ref 17–32)
CREAT SERPL-MCNC: 0.7 MG/DL — SIGNIFICANT CHANGE UP (ref 0.7–1.5)
GLUCOSE SERPL-MCNC: 91 MG/DL — SIGNIFICANT CHANGE UP (ref 70–99)
MAGNESIUM SERPL-MCNC: 2 MG/DL — SIGNIFICANT CHANGE UP (ref 1.8–2.4)
POTASSIUM SERPL-MCNC: 4.1 MMOL/L — SIGNIFICANT CHANGE UP (ref 3.5–5)
POTASSIUM SERPL-SCNC: 4.1 MMOL/L — SIGNIFICANT CHANGE UP (ref 3.5–5)
PROT SERPL-MCNC: 5.6 G/DL — LOW (ref 6–8)
SODIUM SERPL-SCNC: 137 MMOL/L — SIGNIFICANT CHANGE UP (ref 135–146)

## 2019-01-08 RX ORDER — LANOLIN ALCOHOL/MO/W.PET/CERES
1 CREAM (GRAM) TOPICAL
Qty: 0 | Refills: 0 | DISCHARGE
Start: 2019-01-08

## 2019-01-08 RX ORDER — DIAZEPAM 5 MG
1 TABLET ORAL
Qty: 60 | Refills: 0
Start: 2019-01-08 | End: 2019-02-06

## 2019-01-08 RX ORDER — LOSARTAN POTASSIUM 100 MG/1
1 TABLET, FILM COATED ORAL
Qty: 0 | Refills: 0 | DISCHARGE
Start: 2019-01-08

## 2019-01-08 RX ORDER — SODIUM CHLORIDE 5 %
1 DROPS OPHTHALMIC (EYE) THREE TIMES A DAY
Qty: 0 | Refills: 0 | Status: DISCONTINUED | OUTPATIENT
Start: 2019-01-08 | End: 2019-01-08

## 2019-01-08 RX ORDER — ONDANSETRON 8 MG/1
1 TABLET, FILM COATED ORAL
Qty: 120 | Refills: 0
Start: 2019-01-08 | End: 2019-02-06

## 2019-01-08 RX ORDER — CEFPODOXIME PROXETIL 100 MG
100 TABLET ORAL EVERY 12 HOURS
Qty: 0 | Refills: 0 | Status: DISCONTINUED | OUTPATIENT
Start: 2019-01-08 | End: 2019-01-08

## 2019-01-08 RX ORDER — ACETAMINOPHEN 500 MG
2 TABLET ORAL
Qty: 0 | Refills: 0 | DISCHARGE
Start: 2019-01-08

## 2019-01-08 RX ORDER — MECLIZINE HCL 12.5 MG
2 TABLET ORAL
Qty: 0 | Refills: 0 | COMMUNITY

## 2019-01-08 RX ORDER — MECLIZINE HCL 12.5 MG
1 TABLET ORAL
Qty: 0 | Refills: 0 | DISCHARGE
Start: 2019-01-08

## 2019-01-08 RX ORDER — CEFPODOXIME PROXETIL 100 MG
1 TABLET ORAL
Qty: 0 | Refills: 0 | DISCHARGE
Start: 2019-01-08

## 2019-01-08 RX ORDER — MECLIZINE HCL 12.5 MG
0 TABLET ORAL
Qty: 0 | Refills: 0 | COMMUNITY

## 2019-01-08 RX ADMIN — LOSARTAN POTASSIUM 50 MILLIGRAM(S): 100 TABLET, FILM COATED ORAL at 05:18

## 2019-01-08 RX ADMIN — Medication 81 MILLIGRAM(S): at 11:31

## 2019-01-08 RX ADMIN — PANTOPRAZOLE SODIUM 40 MILLIGRAM(S): 20 TABLET, DELAYED RELEASE ORAL at 06:04

## 2019-01-08 RX ADMIN — Medication 25 MILLIGRAM(S): at 14:05

## 2019-01-08 RX ADMIN — HEPARIN SODIUM 5000 UNIT(S): 5000 INJECTION INTRAVENOUS; SUBCUTANEOUS at 05:19

## 2019-01-08 RX ADMIN — Medication 1 DROP(S): at 14:05

## 2019-01-08 RX ADMIN — Medication 25 MILLIGRAM(S): at 05:18

## 2019-01-08 RX ADMIN — Medication 1 DROP(S): at 05:20

## 2019-01-08 RX ADMIN — OXYCODONE AND ACETAMINOPHEN 2 TABLET(S): 5; 325 TABLET ORAL at 09:37

## 2019-01-08 RX ADMIN — Medication 100 MILLIGRAM(S): at 17:23

## 2019-01-08 RX ADMIN — HEPARIN SODIUM 5000 UNIT(S): 5000 INJECTION INTRAVENOUS; SUBCUTANEOUS at 17:23

## 2019-01-08 NOTE — DISCHARGE NOTE ADULT - PATIENT PORTAL LINK FT
You can access the [facility] Patient Portal, offered by [facility], by registering with the following website: [email or web address]

## 2019-01-08 NOTE — DISCHARGE NOTE ADULT - PROVIDER TOKENS
TOKCHARLIE:'[id number]',TOKEN:'[id number] TOKEN:'[id number]',TOKEN:'[id number]:[LAST:[Agusto Jaimes MD],PHONE:[(   )    -],FAX:[(   )    -],ADDRESS:[[address] [place] [address].]]

## 2019-01-08 NOTE — DISCHARGE NOTE ADULT - PLAN OF CARE
Prevent worsening symptoms Continue Meclizine standing and prn.  Valium prn for worsening symptoms.  Ensure adequate Hydration. Good Blood pressure control Patient was on Valsartan/HCTZ. This has been discontinued due to Hyponatremia.  Continue Losartan. Healing. Patient with h/o of abnormal Mammogram and colon cancer 30 years ago s/p resection now with pathologic fracture of the left humerus.  Seen by orthopedic and splint Placed.  She has an appointment on Thursday to see an orthopedic oncologist for further evaluation.  Pain control.  Change of splint to humeral fx brace in 7-10 days. If patient cannot make it to her appointment this can be rescheduled or follow up with Dr. Leland Valero orthopedic surgeon. Treatment Complete Antibiotics. Patient with h/o of abnormal Mammogram and colon cancer 30 years ago s/p resection now with pathologic fracture of the left humerus.  Seen by orthopedic and splint Placed.  She has an appointment on Thursday to see an orthopedic oncologist Agusto Jaimes MD,   [address]   for further evaluation.  Pain control.  Change of splint to humeral fx brace in 7-10 days. If patient cannot make it to her appointment this can be rescheduled or follow up with Dr. Leland Valero orthopedic surgeon.  Please coordinate this with the daughter.

## 2019-01-08 NOTE — PROGRESS NOTE ADULT - ASSESSMENT
Patient is a 89 yo Female with h/o colon cancer s/p Resection 3 years ago, recently found breast mass on mammogram, HTN, Vertigo, Hyperlipidemia admitted with complaints of worsening vertigo and repeated falls. She was brought in by daughter from home requesting placement. Patient had been in her usual state of health until 2 days ago when her dizziness got very worse resulting in 2 falls. Work up done in the ED showed hyponatremia of 129, UTI and with complaints of persistent left arm pain an x-ray was obtained which showed a Left humeral shaft comminuted pathologic fracture. She was admitted for further management.     Assessment and Plan:    1. Dizziness:  History of Chronic vertigo on Meclizine.   Worsening dizziness most likely due to dehydration and hyponatremia.  Continue Meclizine and Valium.       2. UTI:  Complete Antibiotics.  Follow up cultures.      3. Hyponatremia: Resolved.  Encourage PO intake. Monitor BMP.  Off IV fluids.      4. Hypertension:  Hold HCTZ. Continue ARB.  Monitor BP.      5. Left humeral Pathological Fracture:  Splint applied to the left upper extremity.  Motor, sensory, and vascular responses intact in the injured extremity.  Orthopedic consulted: x-rays show lytic lesion with pathologic fx left humerus most likely breast.  Consider radiation oncology for isolated lesion.  Pain control and elevate extremity. Change to humeral fx brace in 7-10 days could be done at SNF.      6. Left breast mass:  Recent Mammogram done out patient showed a left breast mass.  Patient refuses to have any further work up done per Daughter.        DVT prophylaxis: Heparin.  Disposition: STR in a NH recommended.

## 2019-01-08 NOTE — DISCHARGE NOTE ADULT - CARE PLAN
Principal Discharge DX:	Vertigo  Goal:	Prevent worsening symptoms  Assessment and plan of treatment:	Continue Meclizine standing and prn.  Valium prn for worsening symptoms.  Ensure adequate Hydration.  Secondary Diagnosis:	HTN (hypertension)  Goal:	Good Blood pressure control  Assessment and plan of treatment:	Patient was on Valsartan/HCTZ. This has been discontinued due to Hyponatremia.  Continue Losartan.  Secondary Diagnosis:	Pathologic fracture  Goal:	Healing.  Assessment and plan of treatment:	Patient with h/o of abnormal Mammogram and colon cancer 30 years ago s/p resection now with pathologic fracture of the left humerus.  Seen by orthopedic and splint Placed.  She has an appointment on Thursday to see an orthopedic oncologist for further evaluation.  Pain control.  Change of splint to humeral fx brace in 7-10 days. If patient cannot make it to her appointment this can be rescheduled or follow up with Dr. Leland Valero orthopedic surgeon.  Secondary Diagnosis:	UTI (urinary tract infection)  Goal:	Treatment  Assessment and plan of treatment:	Complete Antibiotics. Principal Discharge DX:	Vertigo  Goal:	Prevent worsening symptoms  Assessment and plan of treatment:	Continue Meclizine standing and prn.  Valium prn for worsening symptoms.  Ensure adequate Hydration.  Secondary Diagnosis:	HTN (hypertension)  Goal:	Good Blood pressure control  Assessment and plan of treatment:	Patient was on Valsartan/HCTZ. This has been discontinued due to Hyponatremia.  Continue Losartan.  Secondary Diagnosis:	Pathologic fracture  Goal:	Healing.  Assessment and plan of treatment:	Patient with h/o of abnormal Mammogram and colon cancer 30 years ago s/p resection now with pathologic fracture of the left humerus.  Seen by orthopedic and splint Placed.  She has an appointment on Thursday to see an orthopedic oncologist Agusto Jaimes MD,   [address] [place] [address]   for further evaluation.  Pain control.  Change of splint to humeral fx brace in 7-10 days. If patient cannot make it to her appointment this can be rescheduled or follow up with Dr. Leland Valero orthopedic surgeon.  Please coordinate this with the daughter.  Secondary Diagnosis:	UTI (urinary tract infection)  Goal:	Treatment  Assessment and plan of treatment:	Complete Antibiotics.

## 2019-01-08 NOTE — DISCHARGE NOTE ADULT - CARE PROVIDER_API CALL
Sai Sterling), Family Medicine  [address] [place] [address]  Phone: [phone]  Fax: [phone]    Leland Valero), Orthopaedic Surgery  [address] [place] [address]  Phone: [phone]  Fax: [phone] Sai Sterling), Family Medicine  [address] [place] [address]  Phone: [phone]  Fax: [phone]    Leland Valero), Orthopaedic Surgery  [address] [place] [address]  Phone: [phone]  Fax: [phone]    Agusto Jaimes MD,   [address] [place] [address].  Phone: (   )    -  Fax: (   )    -

## 2019-01-08 NOTE — DISCHARGE NOTE ADULT - MEDICATION SUMMARY - MEDICATIONS TO TAKE
I will START or STAY ON the medications listed below when I get home from the hospital:    aspirin 81 mg oral tablet, chewable  -- 1 tab(s) by mouth once a day  -- Indication: For prophylaxis    acetaminophen 325 mg oral tablet  -- 2 tab(s) by mouth every 6 hours, As needed, Temp greater or equal to 38C (100.4F), Moderate Pain (4 - 6)  -- Indication: For Pain    oxycodone-acetaminophen 5 mg-325 mg oral tablet  -- 2 tab(s) by mouth every 6 hours, As needed, Severe Pain (7 - 10)  -- Indication: For Pain    losartan 50 mg oral tablet  -- 1 tab(s) by mouth once a day  -- Indication: For Hypertension    meclizine 25 mg oral tablet  -- 1 tab(s) by mouth every 12 hours  -- Indication: For Vertigo    meclizine 12.5 mg oral tablet  -- 2 tab(s) by mouth once a day  -- Indication: For Vertigo    Zofran ODT 4 mg oral tablet, disintegrating  -- 1 tab(s) by mouth every 6 hours, As Needed   -- Indication: For Nausea/Vomiting    simvastatin 40 mg oral tablet  -- 1 tab(s) by mouth once a day (at bedtime)  -- Indication: For Hyperlipidemia    cefpodoxime 100 mg oral tablet  -- 1 tab(s) by mouth every 12 hours for 3 days  -- Indication: For UTI    melatonin 3 mg oral tablet  -- 1 tab(s) by mouth once a day (at bedtime), As needed, Insomnia  -- Indication: For Insomnia    omeprazole 20 mg oral delayed release capsule  -- orally 2 times a day  -- Indication: For GERD    Centrum Silver oral tablet  -- 1 tab(s) by mouth once a day  -- Indication: For Supplement I will START or STAY ON the medications listed below when I get home from the hospital:    aspirin 81 mg oral tablet, chewable  -- 1 tab(s) by mouth once a day  -- Indication: For prophylaxis    acetaminophen 325 mg oral tablet  -- 2 tab(s) by mouth every 6 hours, As needed, Temp greater or equal to 38C (100.4F), Moderate Pain (4 - 6)  -- Indication: For Pain    oxycodone-acetaminophen 5 mg-325 mg oral tablet  -- 2 tab(s) by mouth every 6 hours, As needed, Severe Pain (7 - 10)  -- Indication: For Pain    losartan 50 mg oral tablet  -- 1 tab(s) by mouth once a day  -- Indication: For Hypertension    Valium 2 mg oral tablet  -- 1 tab(s) by mouth every 12 hours MDD:2  -- Caution federal law prohibits the transfer of this drug to any person other  than the person for whom it was prescribed.  Do not take this drug if you are pregnant.  May cause drowsiness.  Alcohol may intensify this effect.  Use care when operating dangerous machinery.    -- Indication: For Vertigo    meclizine 25 mg oral tablet  -- 1 tab(s) by mouth every 12 hours  -- Indication: For Vertigo    meclizine 12.5 mg oral tablet  -- 2 tab(s) by mouth once a day  -- Indication: For Vertigo    Zofran ODT 4 mg oral tablet, disintegrating  -- 1 tab(s) by mouth every 6 hours, As Needed   -- Indication: For Nausea/Vomiting    simvastatin 40 mg oral tablet  -- 1 tab(s) by mouth once a day (at bedtime)  -- Indication: For Hyperlipidemia    cefpodoxime 100 mg oral tablet  -- 1 tab(s) by mouth every 12 hours for 3 days  -- Indication: For UTI    melatonin 3 mg oral tablet  -- 1 tab(s) by mouth once a day (at bedtime), As needed, Insomnia  -- Indication: For Insomnia    omeprazole 20 mg oral delayed release capsule  -- orally 2 times a day  -- Indication: For GERD    Centrum Silver oral tablet  -- 1 tab(s) by mouth once a day  -- Indication: For Supplement

## 2019-01-08 NOTE — PROGRESS NOTE ADULT - SUBJECTIVE AND OBJECTIVE BOX
SEBASTIAN VIDA  89y  Female    Patient is a 89y old  Female who presents with a chief complaint of worsening Dizziness (2019 13:09)      INTERVAL HPI/OVERNIGHT EVENTS:  No interval events.  Patient complaining of severe left Upper extremity Pain.   Dizziness improved.      REVIEW OF SYSTEMS:  CONSTITUTIONAL: No fever, weight loss, or fatigue  EYES: No eye pain, visual disturbances, or discharge  ENMT:  No difficulty hearing, tinnitus, vertigo; No sinus or throat pain  NECK: No pain or stiffness  BREASTS: No pain, masses, or nipple discharge  RESPIRATORY: No cough, wheezing, chills or hemoptysis; No shortness of breath  CARDIOVASCULAR: No chest pain, palpitations, + dizziness, No leg swelling  GASTROINTESTINAL: No abdominal or epigastric pain. No nausea, vomiting, or hematemesis; No diarrhea or constipation. No melena or hematochezia.  GENITOURINARY: No dysuria, frequency, hematuria, or incontinence  NEUROLOGICAL: No headaches, memory loss, loss of strength, numbness, or tremors  SKIN: No itching, burning, rashes, or lesions   MUSCULOSKELETAL: Left upper extremity pain +  PSYCHIATRIC: No depression, anxiety, mood swings, or difficulty sleeping  HEME/LYMPH: No easy bruising, or bleeding gums  ALLERGY AND IMMUNOLOGIC: No hives or eczema      VITALS:  T(C): 36.3 (2019 05:18), Max: 36.6 (2019 21:57)  T(F): 97.4 (2019 05:18), Max: 97.8 (2019 21:57)  HR: 72 (2019 05:18) (72 - 81)  BP: 140/97 (2019 05:18) (140/97 - 157/77)  BP(mean): --  RR: 16 (2019 05:18) (16 - 16)  SpO2: --      PHYSICAL EXAM:  GENERAL: NAD  HEAD:  Atraumatic, Normocephalic  EYES: conjunctiva and sclera clear  ENMT: Moist mucous membranes  NECK: Supple, Normal thyroid  NERVOUS SYSTEM:  Alert & Oriented X 3, Motor Strength 5/5 B/L lower extremities and RUE. LUE in splint.  CHEST/LUNG: Clear to auscultation bilaterally; No rales, rhonchi, wheezing, or rubs  HEART: Regular rate and rhythm; No murmurs, rubs, or gallops  ABDOMEN: Soft, Nontender, Nondistended; Bowel sounds present  EXTREMITIES:  2+ Peripheral Pulses, No clubbing, cyanosis, or edema. LUE in splint. Neurovascular intact but swollen.  LYMPH: No lymphadenopathy noted  SKIN: No rashes or lesions    Consultant(s) Notes Reviewed:  [x ] YES  [ ] NO  Care Discussed with Consultants/Other Providers [ x] YES  [ ] NO    LABS:                                              12.0   9.21  )-----------( 265      ( 2019 11:16 )             35.9     -    137  |  98  |  13  ----------------------------<  91  4.1   |  31  |  0.7    Ca    9.0      2019 08:00  Phos  2.7       Mg     2.0         TPro  5.6<L>  /  Alb  3.0<L>  /  TBili  0.8  /  DBili  x   /  AST  33  /  ALT  25  /  AlkPhos  85        Urinalysis Basic - ( 2019 14:24 )    Color: Yellow / Appearance: Clear / S.015 / pH: x  Gluc: x / Ketone: Negative  / Bili: Negative / Urobili: 0.2 mg/dL   Blood: x / Protein: Negative mg/dL / Nitrite: Negative   Leuk Esterase: Small / RBC: 6-10 /HPF / WBC 10-25 /HPF   Sq Epi: x / Non Sq Epi: Moderate /HPF / Bacteria: Many      MICROBIOLOGY:   Culture - Urine (19 @ 14:24)    Specimen Source: .Urine Clean Catch (Midstream)    Culture Results:   Culture grew 3 or more types of organisms which indicate  collection contamination; consider recollection only if clinically  indicated.        RADIOLOGY & ADDITIONAL TESTS:  	X-ray Chest 1 View- PORTABLE-Urgent (19 @ 10:32)     	Support devices: None.    	Cardiac/mediastinum/hilum: Heart size within normal limits, thoracic   	aortic calcification and tortuosity. Right hilar enlargement.    	Lung parenchyma/Pleura: Within normal limits.    	Skeleton/soft tissues: Displaced comminuted left humeral shaft pathologic   	fracture .. Right sixth posterior old rib fracture.. Stable bony   	degenerative changes.      	Impression:      	Right hilar enlargement. Left humeral shaft comminuted pathologic   	fracture better visualized on examination of the humerus January 3, 2019.        	CT Head No Cont (19 @ 11:25)     	1.  No CT evidence of acute intracranial pathology. Stable exam since   	2017.    	2.  Stable moderate-severe chronic microvascular changes.        	X-ray Shoulder 2 Views, Left (19 @ 16:20)     	Left humeral shaft lytic lesion with a pathological fracture   	and angulation. Left third rib lesion. AC joint degenerative changes.   Greater humeral tuberosity peritendinitis.      Imaging Personally Reviewed:  [x] YES  [ ] NO    MEDICATIONS  (STANDING):  aspirin  chewable 81 milliGRAM(s) Oral daily  heparin  Injectable 5000 Unit(s) SubCutaneous every 12 hours  losartan 50 milliGRAM(s) Oral daily  meclizine 25 milliGRAM(s) Oral every 8 hours  pantoprazole    Tablet 40 milliGRAM(s) Oral before breakfast  simvastatin 40 milliGRAM(s) Oral at bedtime  sodium chloride 5% Solution 1 Drop(s) Both EYES three times a day    MEDICATIONS  (PRN):  acetaminophen   Tablet .. 650 milliGRAM(s) Oral every 6 hours PRN Temp greater or equal to 38C (100.4F), Moderate Pain (4 - 6)  melatonin 3 milliGRAM(s) Oral at bedtime PRN Insomnia  morphine  - Injectable 2 milliGRAM(s) IV Push every 6 hours PRN Severe Pain (7 - 10)  ondansetron Injectable 4 milliGRAM(s) IV Push every 6 hours PRN Nausea and/or Vomiting  oxyCODONE    5 mG/acetaminophen 325 mG 2 Tablet(s) Oral every 6 hours PRN Severe Pain (7 - 10)        HEALTH ISSUES - PROBLEM Dx:  Left breast mass (Abnormal Mammogram)  h/o Colon cancer s/p Resection  Vertigo  High cholesterol  HTN (hypertension)

## 2019-01-08 NOTE — DISCHARGE NOTE ADULT - HOSPITAL COURSE
Patient is a 89 yo Female with h/o colon cancer s/p Resection 3 years ago, recently found breast mass on mammogram, HTN, Vertigo, Hyperlipidemia admitted with complaints of worsening vertigo and repeated falls. She was brought in by daughter from home requesting placement. Patient had been in her usual state of health until 2 days ago when her dizziness got very worse resulting in 2 falls. Work up done in the ED showed hyponatremia of 129, UTI and with complaints of persistent left arm pain an x-ray was obtained which showed a Left humeral shaft comminuted pathologic fracture. She was admitted for further management.     Assessment and Plan:    1. Dizziness:  History of Chronic vertigo on Meclizine.   Worsening dizziness most likely due to dehydration and hyponatremia.  Continued Meclizine and Valium.       2. UTI:  Complete Antibiotics.      3. Hyponatremia: Resolved.  Encouraged PO intake. Monitor BMP.  Off IV fluids.      4. Hypertension:  Discontinued HCTZ. Continued ARB.  Monitored BP.      5. Left humeral Pathological Fracture:  Splint applied to the left upper extremity.  Motor, sensory, and vascular responses intact in the injured extremity.  Orthopedic consulted: x-rays show lytic lesion with pathologic fx left humerus most likely breast.  Consider radiation oncology for isolated lesion.  Pain control and elevation of RU extremity. Change of splint to humeral fx brace in 7-10 days could be done at SNF.  Patient will follow up with their orthopedic oncologist after discharge.    6. Left breast mass:  Recent Mammogram done out patient showed a left breast mass.  Patient refused to have any further work up done per Daughter.

## 2019-01-08 NOTE — DISCHARGE NOTE ADULT - CARE PROVIDERS DIRECT ADDRESSES
,DirectAddress_Unknown,[email or web address] ,DirectAddress_Unknown,[email or web address],DirectAddress_Unknown

## 2019-01-08 NOTE — DISCHARGE NOTE ADULT - OTHER SIGNIFICANT FINDINGS
LABS:                                              12.0   9.21  )-----------( 265      ( 2019 11:16 )             35.9     -    137  |  98  |  13  ----------------------------<  91  4.1   |  31  |  0.7    Ca    9.0      2019 08:00  Phos  2.7       Mg     2.0         TPro  5.6<L>  /  Alb  3.0<L>  /  TBili  0.8  /  DBili  x   /  AST  33  /  ALT  25  /  AlkPhos  85        Urinalysis Basic - ( 2019 14:24 )    Color: Yellow / Appearance: Clear / S.015 / pH: x  Gluc: x / Ketone: Negative  / Bili: Negative / Urobili: 0.2 mg/dL   Blood: x / Protein: Negative mg/dL / Nitrite: Negative   Leuk Esterase: Small / RBC: 6-10 /HPF / WBC 10-25 /HPF   Sq Epi: x / Non Sq Epi: Moderate /HPF / Bacteria: Many      MICROBIOLOGY:   Culture - Urine (19 @ 14:24)    Specimen Source: .Urine Clean Catch (Midstream)    Culture Results:   Culture grew 3 or more types of organisms which indicate  collection contamination; consider recollection only if clinically  indicated.        RADIOLOGY & ADDITIONAL TESTS:  	X-ray Chest 1 View- PORTABLE-Urgent (19 @ 10:32)     	Support devices: None.    	Cardiac/mediastinum/hilum: Heart size within normal limits, thoracic   	aortic calcification and tortuosity. Right hilar enlargement.    	Lung parenchyma/Pleura: Within normal limits.    	Skeleton/soft tissues: Displaced comminuted left humeral shaft pathologic   	fracture .. Right sixth posterior old rib fracture.. Stable bony   	degenerative changes.      	Impression:      	Right hilar enlargement. Left humeral shaft comminuted pathologic   	fracture better visualized on examination of the humerus January 3, 2019.        	CT Head No Cont (19 @ 11:25)     	1.  No CT evidence of acute intracranial pathology. Stable exam since   	2017.    	2.  Stable moderate-severe chronic microvascular changes.        	X-ray Shoulder 2 Views, Left (19 @ 16:20)     	Left humeral shaft lytic lesion with a pathological fracture   	and angulation. Left third rib lesion. AC joint degenerative changes.   Greater humeral tuberosity peritendinitis.

## 2019-01-09 ENCOUNTER — OUTPATIENT (OUTPATIENT)
Dept: OUTPATIENT SERVICES | Facility: HOSPITAL | Age: 84
LOS: 1 days | Discharge: HOME | End: 2019-01-09

## 2019-01-09 DIAGNOSIS — E55.9 VITAMIN D DEFICIENCY, UNSPECIFIED: ICD-10-CM

## 2019-01-09 DIAGNOSIS — Z90.49 ACQUIRED ABSENCE OF OTHER SPECIFIED PARTS OF DIGESTIVE TRACT: Chronic | ICD-10-CM

## 2019-01-09 PROBLEM — R42 DIZZINESS AND GIDDINESS: Chronic | Status: ACTIVE | Noted: 2019-01-05

## 2019-01-10 DIAGNOSIS — S42.352A DISPLACED COMMINUTED FRACTURE OF SHAFT OF HUMERUS, LEFT ARM, INITIAL ENCOUNTER FOR CLOSED FRACTURE: ICD-10-CM

## 2019-01-10 DIAGNOSIS — R26.89 OTHER ABNORMALITIES OF GAIT AND MOBILITY: ICD-10-CM
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
## 2019-01-10 DIAGNOSIS — E87.1 HYPO-OSMOLALITY AND HYPONATREMIA: ICD-10-CM

## 2019-01-10 DIAGNOSIS — N39.0 URINARY TRACT INFECTION, SITE NOT SPECIFIED: ICD-10-CM

## 2019-01-10 DIAGNOSIS — I67.82 CEREBRAL ISCHEMIA: ICD-10-CM

## 2019-01-10 DIAGNOSIS — E78.5 HYPERLIPIDEMIA, UNSPECIFIED: ICD-10-CM

## 2019-01-10 DIAGNOSIS — E86.0 DEHYDRATION: ICD-10-CM

## 2019-01-10 DIAGNOSIS — R29.6 REPEATED FALLS: ICD-10-CM
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
## 2019-01-10 DIAGNOSIS — Z79.82 LONG TERM (CURRENT) USE OF ASPIRIN: ICD-10-CM

## 2019-01-10 DIAGNOSIS — Z87.81 PERSONAL HISTORY OF (HEALED) TRAUMATIC FRACTURE: ICD-10-CM

## 2019-01-10 DIAGNOSIS — R42 DIZZINESS AND GIDDINESS: ICD-10-CM

## 2019-01-10 DIAGNOSIS — M89.8X0 OTHER SPECIFIED DISORDERS OF BONE, MULTIPLE SITES: ICD-10-CM

## 2019-01-10 DIAGNOSIS — N63.20 UNSPECIFIED LUMP IN THE LEFT BREAST, UNSPECIFIED QUADRANT: ICD-10-CM
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
## 2019-01-10 DIAGNOSIS — Z53.29 PROCEDURE AND TREATMENT NOT CARRIED OUT BECAUSE OF PATIENT'S DECISION FOR OTHER REASONS: ICD-10-CM

## 2019-01-10 DIAGNOSIS — Z85.038 PERSONAL HISTORY OF OTHER MALIGNANT NEOPLASM OF LARGE INTESTINE: ICD-10-CM

## 2019-01-10 DIAGNOSIS — I10 ESSENTIAL (PRIMARY) HYPERTENSION: ICD-10-CM
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
## 2019-02-05 PROBLEM — Z00.00 ENCOUNTER FOR PREVENTIVE HEALTH EXAMINATION: Status: ACTIVE | Noted: 2019-02-05

## 2019-02-14 ENCOUNTER — LABORATORY RESULT (OUTPATIENT)
Age: 84
End: 2019-02-14

## 2019-02-14 ENCOUNTER — APPOINTMENT (OUTPATIENT)
Dept: HEMATOLOGY ONCOLOGY | Facility: CLINIC | Age: 84
End: 2019-02-14

## 2019-02-14 VITALS
BODY MASS INDEX: 24.6 KG/M2 | HEIGHT: 59 IN | SYSTOLIC BLOOD PRESSURE: 122 MMHG | DIASTOLIC BLOOD PRESSURE: 75 MMHG | HEART RATE: 77 BPM | RESPIRATION RATE: 14 BRPM | WEIGHT: 122 LBS | TEMPERATURE: 97 F

## 2019-02-14 DIAGNOSIS — I10 ESSENTIAL (PRIMARY) HYPERTENSION: ICD-10-CM

## 2019-02-14 LAB
ALBUMIN SERPL ELPH-MCNC: 3.5 G/DL
ALP BLD-CCNC: 126 U/L
ALT SERPL-CCNC: 12 U/L
ANION GAP SERPL CALC-SCNC: 16 MMOL/L
AST SERPL-CCNC: 21 U/L
BILIRUB SERPL-MCNC: 0.4 MG/DL
BUN SERPL-MCNC: 14 MG/DL
CALCIUM SERPL-MCNC: 9.7 MG/DL
CHLORIDE SERPL-SCNC: 98 MMOL/L
CO2 SERPL-SCNC: 25 MMOL/L
CREAT SERPL-MCNC: 0.7 MG/DL
GLUCOSE SERPL-MCNC: 105 MG/DL
HCT VFR BLD CALC: 35.2 %
HGB BLD-MCNC: 11.2 G/DL
MCHC RBC-ENTMCNC: 29.3 PG
MCHC RBC-ENTMCNC: 31.8 G/DL
MCV RBC AUTO: 92.1 FL
PLATELET # BLD AUTO: 213 K/UL
PMV BLD: 9.3 FL
POTASSIUM SERPL-SCNC: 4.2 MMOL/L
PROT SERPL-MCNC: 6.5 G/DL
RBC # BLD: 3.79 M/UL
RBC # BLD: 3.82 M/UL
RBC # FLD: 14.6 %
RETICS # AUTO: 2.2 %
RETICS # AUTO: 2.9 %
RETICS AGGREG/RBC NFR: 108.4 K/UL
RETICS AGGREG/RBC NFR: 83.1 K/UL
SODIUM SERPL-SCNC: 139 MMOL/L
WBC # FLD AUTO: 7.63 K/UL

## 2019-02-14 NOTE — PHYSICAL EXAM
[Capable of only limited self care, confined to bed or chair more than 50% of waking hours] : Status 3- Capable of only limited self care, confined to bed or chair more than 50% of waking hours [Normal] : full range of motion and no deformities appreciated [de-identified] : well preserved elderly female , alert , oriented . speech fluent .  [de-identified] : large right breast mass 8 to 10 cms in inner quadrants with skin and nipple involvement , no ulcerations or fungation. no axillary adenopathy .

## 2019-02-14 NOTE — HISTORY OF PRESENT ILLNESS
[de-identified] : 89 year old white female referred by Dr Jaimes for newly diagnosed breast cancer . PMH significant for colon cancer 25 year ago , hypertension and hyperlipidemia. She was in her usual state of health until recently when she complained of left arm pain and developed pathologic fracture of left humerus . In January 2019 , she underwent open reduction and internal fixation of the fracture at [facility] and was transferred for subacute rehab . Biopsy was consistent with adenocarcinoma of breast ER and MI strongly positive , HER2 negative . 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 The patient had noted a right breast mass for few years and did not seek medical attention .Since surgery she complains of mild left arm pain , she is progressing with therapy and able to ambulate using a cane . She lost few lbs and denies abdominal pain , cough , SOB , breast discharge or bleeding .Lab work significant for mild normocytic anemia . The patient lives in apartment in her daughter's house and is capable of limited self care .

## 2019-02-14 NOTE — ASSESSMENT
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 Lab work significant for mild anemia , calciium : 9.7 ( albumin : 3.7 ) normal LFTs. Will obtain outside records including imaging studies , biopsy slides, check tumor markers . \par The diagnosis , natural history and treatment of metastatic hormone positive breast cancer was discussed at length with the patient and daughter with emphasis on primary endocrine therapy ; response is seen in the majority of patients ( nearly 75%) both in the breast  and metastatic sites . Hopefully , local therapy will not be necessary . \par We discussed potential adverse effects including bone loss , arthralgias and vasomotor symptoms. Will also consider bone directed therapy ( denosumab ) . The role of consolidative radiation to the humerus was discussed and referral for RT evaluation was recommended. \par The patient will return for follow up in one month . All her questions and daughter's concerns were addressed.

## 2019-02-14 NOTE — REASON FOR VISIT
[Initial Consultation] : an initial consultation [Family Member] : family member [FreeTextEntry2] : breast cancer

## 2019-02-19 ENCOUNTER — RX RENEWAL (OUTPATIENT)
Age: 84
End: 2019-02-19

## 2019-02-19 ENCOUNTER — OTHER (OUTPATIENT)
Age: 84
End: 2019-02-19

## 2019-02-19 LAB
25(OH)D3 SERPL-MCNC: 45 NG/ML
CANCER AG15-3 SERPL-ACNC: 96.1 U/ML
CANCER AG27-29 SERPL-ACNC: 110.8 U/ML
CEA SERPL-MCNC: 3.3 NG/ML
FERRITIN SERPL-MCNC: 75 NG/ML
T4 FREE SERPL-MCNC: 0.9 NG/DL
T4 SERPL-MCNC: 5.7 UG/DL
TSH SERPL-ACNC: 5.45 UIU/ML

## 2019-03-04 ENCOUNTER — APPOINTMENT (OUTPATIENT)
Dept: HEMATOLOGY ONCOLOGY | Facility: CLINIC | Age: 84
End: 2019-03-04

## 2019-03-04 VITALS
HEIGHT: 59 IN | HEART RATE: 72 BPM | WEIGHT: 116 LBS | DIASTOLIC BLOOD PRESSURE: 73 MMHG | RESPIRATION RATE: 14 BRPM | TEMPERATURE: 96.1 F | SYSTOLIC BLOOD PRESSURE: 142 MMHG | BODY MASS INDEX: 23.39 KG/M2

## 2019-03-04 NOTE — ASSESSMENT
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 On it since feb 2019. Tolerating well.\par - Radonc f/u for palliative RT to left humerus. \par - Bone scan from outside medical records revealed additional lesion in the right hemipelvis. \par - Will decide on bisphosphonate treatment during the next visit.\par \par Mild anemia: Monitor for now. \par \par RTC in 1 month.\par Pt seen and examined with \par

## 2019-03-04 NOTE — PHYSICAL EXAM
[Capable of only limited self care, confined to bed or chair more than 50% of waking hours] : Status 3- Capable of only limited self care, confined to bed or chair more than 50% of waking hours [Normal] : full range of motion and no deformities appreciated [de-identified] : well preserved elderly female , alert , oriented . speech fluent .  [de-identified] : large right breast mass 8 to 10 cms in inner quadrants with skin and nipple involvement , no ulcerations or fungation. no axillary adenopathy .

## 2019-03-04 NOTE — HISTORY OF PRESENT ILLNESS
[de-identified] : 89 year old white female referred by Dr Jaimes for newly diagnosed breast cancer . PMH significant for colon cancer 25 year ago , hypertension and hyperlipidemia. She was in her usual state of health until recently when she complained of left arm pain and developed pathologic fracture of left humerus . In January 2019 , she underwent open reduction and internal fixation of the fracture at [facility] and was transferred for subacute rehab . Biopsy was consistent with adenocarcinoma of breast ER and SD strongly positive , HER2 negative . 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 The patient had noted a right breast mass for few years and did not seek medical attention .Since surgery she complains of mild left arm pain , she is progressing with therapy and able to ambulate using a cane . She lost few lbs and denies abdominal pain , cough , SOB , breast discharge or bleeding .Lab work significant for mild normocytic anemia . The patient lives in apartment in her daughter's house and is capable of limited self care .  [de-identified] : 3/4/19:\par Discharged home from NH.\par Doing well. No major complaints.\par Denies fever, nausea, vomiting, chest pain, SOB, abdominal pain, bowel and bladder problems.\par On Letrozole since feb 2019. \par Seeing  on Wednesday. \par Left shoulder ROM restricted.

## 2019-03-08 ENCOUNTER — OUTPATIENT (OUTPATIENT)
Dept: OUTPATIENT SERVICES | Facility: HOSPITAL | Age: 84
LOS: 1 days | Discharge: HOME | End: 2019-03-08

## 2019-03-08 DIAGNOSIS — C79.51 SECONDARY MALIGNANT NEOPLASM OF BONE: ICD-10-CM

## 2019-03-08 DIAGNOSIS — Z90.49 ACQUIRED ABSENCE OF OTHER SPECIFIED PARTS OF DIGESTIVE TRACT: Chronic | ICD-10-CM

## 2019-03-09 ENCOUNTER — OTHER (OUTPATIENT)
Age: 84
End: 2019-03-09

## 2019-03-11 ENCOUNTER — RX RENEWAL (OUTPATIENT)
Age: 84
End: 2019-03-11

## 2019-03-13 ENCOUNTER — OUTPATIENT (OUTPATIENT)
Dept: OUTPATIENT SERVICES | Facility: HOSPITAL | Age: 84
LOS: 1 days | Discharge: HOME | End: 2019-03-13

## 2019-03-13 ENCOUNTER — LABORATORY RESULT (OUTPATIENT)
Age: 84
End: 2019-03-13

## 2019-03-13 DIAGNOSIS — R89.7 ABNORMAL HISTOLOGICAL FINDINGS IN SPECIMENS FROM OTHER ORGANS, SYSTEMS AND TISSUES: ICD-10-CM

## 2019-03-13 DIAGNOSIS — Z90.49 ACQUIRED ABSENCE OF OTHER SPECIFIED PARTS OF DIGESTIVE TRACT: Chronic | ICD-10-CM

## 2019-03-18 ENCOUNTER — OUTPATIENT (OUTPATIENT)
Dept: OUTPATIENT SERVICES | Facility: HOSPITAL | Age: 84
LOS: 1 days | Discharge: HOME | End: 2019-03-18

## 2019-03-18 DIAGNOSIS — Z90.49 ACQUIRED ABSENCE OF OTHER SPECIFIED PARTS OF DIGESTIVE TRACT: Chronic | ICD-10-CM

## 2019-03-18 DIAGNOSIS — E78.5 HYPERLIPIDEMIA, UNSPECIFIED: ICD-10-CM

## 2019-03-18 DIAGNOSIS — D63.1 ANEMIA IN CHRONIC KIDNEY DISEASE: ICD-10-CM

## 2019-04-08 ENCOUNTER — LABORATORY RESULT (OUTPATIENT)
Age: 84
End: 2019-04-08

## 2019-04-08 ENCOUNTER — APPOINTMENT (OUTPATIENT)
Dept: HEMATOLOGY ONCOLOGY | Facility: CLINIC | Age: 84
End: 2019-04-08

## 2019-04-08 VITALS
WEIGHT: 122 LBS | BODY MASS INDEX: 24.6 KG/M2 | RESPIRATION RATE: 14 BRPM | HEART RATE: 70 BPM | TEMPERATURE: 96.2 F | DIASTOLIC BLOOD PRESSURE: 64 MMHG | SYSTOLIC BLOOD PRESSURE: 136 MMHG | HEIGHT: 59 IN

## 2019-04-09 LAB
ALBUMIN SERPL ELPH-MCNC: 3.7 G/DL
ALP BLD-CCNC: 123 U/L
ALT SERPL-CCNC: 14 U/L
ANION GAP SERPL CALC-SCNC: 11 MMOL/L
AST SERPL-CCNC: 23 U/L
BILIRUB SERPL-MCNC: 0.4 MG/DL
BUN SERPL-MCNC: 15 MG/DL
CALCIUM SERPL-MCNC: 9.3 MG/DL
CANCER AG15-3 SERPL-ACNC: 50.3 U/ML
CANCER AG27-29 SERPL-ACNC: 56.9 U/ML
CEA SERPL-MCNC: 2.9 NG/ML
CHLORIDE SERPL-SCNC: 102 MMOL/L
CO2 SERPL-SCNC: 28 MMOL/L
CREAT SERPL-MCNC: 0.7 MG/DL
GLUCOSE SERPL-MCNC: 87 MG/DL
HCT VFR BLD CALC: 37.5 %
HGB BLD-MCNC: 11.7 G/DL
MCHC RBC-ENTMCNC: 27.9 PG
MCHC RBC-ENTMCNC: 31.2 G/DL
MCV RBC AUTO: 89.3 FL
PLATELET # BLD AUTO: 218 K/UL
PMV BLD: 9.2 FL
POTASSIUM SERPL-SCNC: 4.5 MMOL/L
PROT SERPL-MCNC: 6.6 G/DL
RBC # BLD: 4.2 M/UL
RBC # FLD: 14.1 %
SODIUM SERPL-SCNC: 141 MMOL/L
WBC # FLD AUTO: 6.01 K/UL

## 2019-04-09 NOTE — ASSESSMENT
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 since feb 2019. Tolerating well.\par - S/p 1 fraction of  palliative RT to left humerus. F/u with  \par - CT C/A/P prescription given to reassess the disease. \par - Will decide on bisphosphonate treatment during the next visit.\par - Labs ordered including tumor markers. \par \par Mild anemia: Monitor for now. \par \par RTC in 1 month.\par Pt seen and examined with \par

## 2019-04-09 NOTE — CONSULT LETTER
[Dear  ___] : Dear  [unfilled], [Consult Letter:] : I had the pleasure of evaluating your patient, [unfilled]. [DrHoracio  ___] : Dr. TRUONG

## 2019-04-09 NOTE — PHYSICAL EXAM
[Capable of only limited self care, confined to bed or chair more than 50% of waking hours] : Status 3- Capable of only limited self care, confined to bed or chair more than 50% of waking hours [Normal] : full range of motion and no deformities appreciated [de-identified] : well preserved elderly female , alert , oriented . speech fluent .  [de-identified] : large right breast mass 8 to 10 cms in inner quadrants with skin and nipple involvement , no ulcerations or fungation. no axillary adenopathy .

## 2019-04-09 NOTE — HISTORY OF PRESENT ILLNESS
[de-identified] : 89 year old white female referred by Dr Jaimes for newly diagnosed breast cancer . PMH significant for colon cancer 25 year ago , hypertension and hyperlipidemia. She was in her usual state of health until recently when she complained of left arm pain and developed pathologic fracture of left humerus . In January 2019 , she underwent open reduction and internal fixation of the fracture at [place] and was transferred for subacute rehab . Biopsy was consistent with adenocarcinoma of breast ER and UT strongly positive , HER2 negative . 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 The patient had noted a right breast mass for few years and did not seek medical attention .Since surgery she complains of mild left arm pain , she is progressing with therapy and able to ambulate using a cane . She lost few lbs and denies abdominal pain , cough , SOB , breast discharge or bleeding .Lab work significant for mild normocytic anemia . The patient lives in apartment in her daughter's house and is capable of limited self care .  [de-identified] : 3/4/19:\par Discharged home from NH.\par Doing well. No major complaints.\par Denies fever, nausea, vomiting, chest pain, SOB, abdominal pain, bowel and bladder problems.\par On Letrozole since feb 2019. \par Seeing  on Wednesday. \par Left shoulder ROM restricted. \par \par 4/8/19:\par Doing well. No major complaints.\par Denies fever, nausea, vomiting, chest pain, SOB, abdominal pain, bowel and bladder problems.\par On Letrozole since feb 2019. \par Appt with  on Friday. S/p 1 session RT to left humerus. \par Left shoulder ROM restricted

## 2019-04-15 ENCOUNTER — FORM ENCOUNTER (OUTPATIENT)
Age: 84
End: 2019-04-15

## 2019-04-16 ENCOUNTER — OUTPATIENT (OUTPATIENT)
Dept: OUTPATIENT SERVICES | Facility: HOSPITAL | Age: 84
LOS: 1 days | Discharge: HOME | End: 2019-04-16
Payer: MEDICARE

## 2019-04-16 DIAGNOSIS — Z90.49 ACQUIRED ABSENCE OF OTHER SPECIFIED PARTS OF DIGESTIVE TRACT: Chronic | ICD-10-CM

## 2019-04-16 DIAGNOSIS — C50.919 MALIGNANT NEOPLASM OF UNSPECIFIED SITE OF UNSPECIFIED FEMALE BREAST: ICD-10-CM

## 2019-04-16 PROCEDURE — 71260 CT THORAX DX C+: CPT | Mod: 26

## 2019-04-16 PROCEDURE — 74177 CT ABD & PELVIS W/CONTRAST: CPT | Mod: 26

## 2019-04-21 ENCOUNTER — EMERGENCY (EMERGENCY)
Facility: HOSPITAL | Age: 84
LOS: 0 days | Discharge: HOME | End: 2019-04-21
Attending: EMERGENCY MEDICINE | Admitting: EMERGENCY MEDICINE
Payer: MEDICARE

## 2019-04-21 VITALS
DIASTOLIC BLOOD PRESSURE: 91 MMHG | SYSTOLIC BLOOD PRESSURE: 199 MMHG | RESPIRATION RATE: 18 BRPM | HEIGHT: 59 IN | TEMPERATURE: 98 F | WEIGHT: 119.93 LBS | OXYGEN SATURATION: 96 % | HEART RATE: 80 BPM

## 2019-04-21 DIAGNOSIS — H55.00 UNSPECIFIED NYSTAGMUS: ICD-10-CM

## 2019-04-21 DIAGNOSIS — Z79.899 OTHER LONG TERM (CURRENT) DRUG THERAPY: ICD-10-CM

## 2019-04-21 DIAGNOSIS — I10 ESSENTIAL (PRIMARY) HYPERTENSION: ICD-10-CM

## 2019-04-21 DIAGNOSIS — R42 DIZZINESS AND GIDDINESS: ICD-10-CM

## 2019-04-21 DIAGNOSIS — Z79.82 LONG TERM (CURRENT) USE OF ASPIRIN: ICD-10-CM

## 2019-04-21 DIAGNOSIS — Z79.2 LONG TERM (CURRENT) USE OF ANTIBIOTICS: ICD-10-CM

## 2019-04-21 DIAGNOSIS — Z90.49 ACQUIRED ABSENCE OF OTHER SPECIFIED PARTS OF DIGESTIVE TRACT: ICD-10-CM

## 2019-04-21 DIAGNOSIS — Z85.038 PERSONAL HISTORY OF OTHER MALIGNANT NEOPLASM OF LARGE INTESTINE: ICD-10-CM

## 2019-04-21 DIAGNOSIS — Z79.891 LONG TERM (CURRENT) USE OF OPIATE ANALGESIC: ICD-10-CM

## 2019-04-21 DIAGNOSIS — E78.00 PURE HYPERCHOLESTEROLEMIA, UNSPECIFIED: ICD-10-CM

## 2019-04-21 DIAGNOSIS — R11.2 NAUSEA WITH VOMITING, UNSPECIFIED: ICD-10-CM

## 2019-04-21 DIAGNOSIS — E78.5 HYPERLIPIDEMIA, UNSPECIFIED: ICD-10-CM

## 2019-04-21 DIAGNOSIS — Z90.49 ACQUIRED ABSENCE OF OTHER SPECIFIED PARTS OF DIGESTIVE TRACT: Chronic | ICD-10-CM

## 2019-04-21 LAB
ALBUMIN SERPL ELPH-MCNC: 4 G/DL — SIGNIFICANT CHANGE UP (ref 3.5–5.2)
ALP SERPL-CCNC: 152 U/L — HIGH (ref 30–115)
ALT FLD-CCNC: 16 U/L — SIGNIFICANT CHANGE UP (ref 0–41)
ANION GAP SERPL CALC-SCNC: 13 MMOL/L — SIGNIFICANT CHANGE UP (ref 7–14)
AST SERPL-CCNC: 29 U/L — SIGNIFICANT CHANGE UP (ref 0–41)
BILIRUB SERPL-MCNC: 0.6 MG/DL — SIGNIFICANT CHANGE UP (ref 0.2–1.2)
BUN SERPL-MCNC: 18 MG/DL — SIGNIFICANT CHANGE UP (ref 10–20)
CALCIUM SERPL-MCNC: 9.7 MG/DL — SIGNIFICANT CHANGE UP (ref 8.5–10.1)
CHLORIDE SERPL-SCNC: 102 MMOL/L — SIGNIFICANT CHANGE UP (ref 98–110)
CO2 SERPL-SCNC: 27 MMOL/L — SIGNIFICANT CHANGE UP (ref 17–32)
CREAT SERPL-MCNC: 0.7 MG/DL — SIGNIFICANT CHANGE UP (ref 0.7–1.5)
GLUCOSE SERPL-MCNC: 175 MG/DL — HIGH (ref 70–99)
HCT VFR BLD CALC: 42.6 % — SIGNIFICANT CHANGE UP (ref 37–47)
HGB BLD-MCNC: 13.4 G/DL — SIGNIFICANT CHANGE UP (ref 12–16)
MAGNESIUM SERPL-MCNC: 1.7 MG/DL — LOW (ref 1.8–2.4)
MCHC RBC-ENTMCNC: 28 PG — SIGNIFICANT CHANGE UP (ref 27–31)
MCHC RBC-ENTMCNC: 31.5 G/DL — LOW (ref 32–37)
MCV RBC AUTO: 88.9 FL — SIGNIFICANT CHANGE UP (ref 81–99)
NRBC # BLD: 0 /100 WBCS — SIGNIFICANT CHANGE UP (ref 0–0)
PLATELET # BLD AUTO: 213 K/UL — SIGNIFICANT CHANGE UP (ref 130–400)
POTASSIUM SERPL-MCNC: 4.3 MMOL/L — SIGNIFICANT CHANGE UP (ref 3.5–5)
POTASSIUM SERPL-SCNC: 4.3 MMOL/L — SIGNIFICANT CHANGE UP (ref 3.5–5)
PROT SERPL-MCNC: 7.4 G/DL — SIGNIFICANT CHANGE UP (ref 6–8)
RBC # BLD: 4.79 M/UL — SIGNIFICANT CHANGE UP (ref 4.2–5.4)
RBC # FLD: 14 % — SIGNIFICANT CHANGE UP (ref 11.5–14.5)
SODIUM SERPL-SCNC: 142 MMOL/L — SIGNIFICANT CHANGE UP (ref 135–146)
TROPONIN T SERPL-MCNC: <0.01 NG/ML — SIGNIFICANT CHANGE UP
WBC # BLD: 4.78 K/UL — LOW (ref 4.8–10.8)
WBC # FLD AUTO: 4.78 K/UL — LOW (ref 4.8–10.8)

## 2019-04-21 PROCEDURE — 70450 CT HEAD/BRAIN W/O DYE: CPT | Mod: 26

## 2019-04-21 PROCEDURE — 99285 EMERGENCY DEPT VISIT HI MDM: CPT

## 2019-04-21 PROCEDURE — 71045 X-RAY EXAM CHEST 1 VIEW: CPT | Mod: 26

## 2019-04-21 RX ORDER — SODIUM CHLORIDE 9 MG/ML
500 INJECTION INTRAMUSCULAR; INTRAVENOUS; SUBCUTANEOUS ONCE
Qty: 0 | Refills: 0 | Status: COMPLETED | OUTPATIENT
Start: 2019-04-21 | End: 2019-04-21

## 2019-04-21 RX ORDER — MAGNESIUM SULFATE 500 MG/ML
2 VIAL (ML) INJECTION ONCE
Qty: 0 | Refills: 0 | Status: COMPLETED | OUTPATIENT
Start: 2019-04-21 | End: 2019-04-21

## 2019-04-21 RX ORDER — DIAZEPAM 5 MG
2.5 TABLET ORAL ONCE
Qty: 0 | Refills: 0 | Status: DISCONTINUED | OUTPATIENT
Start: 2019-04-21 | End: 2019-04-21

## 2019-04-21 RX ORDER — MECLIZINE HCL 12.5 MG
37.5 TABLET ORAL ONCE
Qty: 0 | Refills: 0 | Status: COMPLETED | OUTPATIENT
Start: 2019-04-21 | End: 2019-04-21

## 2019-04-21 RX ORDER — ONDANSETRON 8 MG/1
4 TABLET, FILM COATED ORAL ONCE
Qty: 0 | Refills: 0 | Status: COMPLETED | OUTPATIENT
Start: 2019-04-21 | End: 2019-04-21

## 2019-04-21 RX ADMIN — Medication 50 GRAM(S): at 19:16

## 2019-04-21 RX ADMIN — Medication 37.5 MILLIGRAM(S): at 18:32

## 2019-04-21 RX ADMIN — SODIUM CHLORIDE 500 MILLILITER(S): 9 INJECTION INTRAMUSCULAR; INTRAVENOUS; SUBCUTANEOUS at 19:31

## 2019-04-21 RX ADMIN — SODIUM CHLORIDE 500 MILLILITER(S): 9 INJECTION INTRAMUSCULAR; INTRAVENOUS; SUBCUTANEOUS at 18:25

## 2019-04-21 RX ADMIN — ONDANSETRON 4 MILLIGRAM(S): 8 TABLET, FILM COATED ORAL at 18:23

## 2019-04-21 NOTE — ED ADULT NURSE NOTE - NSIMPLEMENTINTERV_GEN_ALL_ED
Implemented All Fall with Harm Risk Interventions:  [place] to call system. Call bell, personal items and telephone within reach. Instruct patient to call for assistance. Room bathroom lighting operational. Non-slip footwear when patient is off stretcher. Physically safe environment: no spills, clutter or unnecessary equipment. Stretcher in lowest position, wheels locked, appropriate side rails in place. Provide visual cue, wrist band, yellow gown, etc. Monitor gait and stability. Monitor for mental status changes and reorient to person, place, and time. Review medications for side effects contributing to fall risk. Reinforce activity limits and safety measures with patient and family. Provide visual clues: red socks.

## 2019-04-21 NOTE — ED PROVIDER NOTE - PROGRESS NOTE DETAILS
feels improved.  requesting d/c. ambulating w/ steady gait.  requesting d/c  pt and family feel safe going home.  discussed to return at any time for concerning s/s. Discussed results with pt.  All questions were answered and return precautions discussed.  Pt is asx and comfortable at this time.  Unremarkable re-exam.  No further concerns at this time from pt.  Will follow up with PMD.  Pt understands and agrees with tx plan.

## 2019-04-21 NOTE — ED PROVIDER NOTE - CARE PROVIDER_API CALL
Steven Mix)  EEGEpilepsy; Neurology  [address] [place] [address], [address]  [place] [address]  Phone: [phone]  Fax: [phone]  Follow Up Time: 1-3 Days

## 2019-04-21 NOTE — ED PROVIDER NOTE - NS ED ROS FT
Constitutional: See HPI.  Eyes: No visual changes, eye pain or discharge  ENMT: No hearing changes, pain, discharge or infections.   Cardiac: No SOB or edema. No chest pain with exertion.  Respiratory: No cough or respiratory distress.   GI: + nausea. No vomiting, diarrhea or abdominal pain.  : No dysuria, frequency or burning. No Discharge  MS: No myalgia, muscle weakness, joint pain or back pain.  Neuro: +dizziness. No headache or weakness.   Skin: No skin rash.  Except as documented in the HPI, all other systems are negative.

## 2019-04-21 NOTE — ED PROVIDER NOTE - PHYSICAL EXAMINATION
CONST: Well appearing in NAD  EYES: PERRL, EOMI, Sclera and conjunctiva clear.  ENT: No nasal discharge. TM's clear B/L without drainage. Oropharynx normal appearing, no erythema or exudates. Uvula midline.  NECK: Non-tender, no meningeal signs, supple  CARD: Normal S1 S2; Normal rate and rhythm  RESP: Equal BS B/L, No wheezes, rhonchi or rales. No distress  GI: Soft, non-tender, non-distended.  MS: Normal ROM in all extremities. No edema of lower extremities, no calf pain, radial pulses 2+ bilaterally  SKIN: Warm, dry, no acute rashes. Good turgor  NEURO: A&Ox3, CN II-XII intact, no focal deficits, no facial asymmetry, no pronator drift, normal finger to nose, + horizontal nystagmus, gross sensation intact, UE/LE strength 5/5, stable gait

## 2019-04-21 NOTE — ED PROVIDER NOTE - OBJECTIVE STATEMENT
89 y.o female w/ hx of chronic vertigo 89 y.o female w/ hx of chronic vertigo, colon CA, HTN, vertigo, HLD presents to the ED for evaluation of dizziness.  States that today woke up with dizziness that would not go away w/ meclizine 12.5 mg x 3 prompting visit to the ED. Describes dizziness as room spinning, worse w/ movement of head, associated with nausea, no alleviating factors, moderate severity.  Denies any other complaints.  Currently not following with neurology. Denies chest pain, dyspnea, headache, changes in vision, ear pain.

## 2019-04-21 NOTE — ED PROVIDER NOTE - ATTENDING CONTRIBUTION TO CARE
89 F to ED with diziness and vertibo at home with vomiting.  h/o chronic vertigo and HTN, colon CA.  pt has been taking her meclizine but sx not improving and so to ED for eval.  AVSS, exam as noted, CTAB, RRR, abdomen soft NTND, (+) bowel sounds, neuro nonfocal

## 2019-04-21 NOTE — ED ADULT NURSE REASSESSMENT NOTE - NS ED NURSE REASSESS COMMENT FT1
Pt ambulated from bed  pt ambulating well with some assistance. pt states she feels a lot better. will cont to monitor

## 2019-04-21 NOTE — ED PROVIDER NOTE - NSFOLLOWUPINSTRUCTIONS_ED_ALL_ED_FT

## 2019-05-06 ENCOUNTER — OUTPATIENT (OUTPATIENT)
Dept: OUTPATIENT SERVICES | Facility: HOSPITAL | Age: 84
LOS: 1 days | Discharge: HOME | End: 2019-05-06

## 2019-05-06 ENCOUNTER — LABORATORY RESULT (OUTPATIENT)
Age: 84
End: 2019-05-06

## 2019-05-06 ENCOUNTER — APPOINTMENT (OUTPATIENT)
Dept: HEMATOLOGY ONCOLOGY | Facility: CLINIC | Age: 84
End: 2019-05-06

## 2019-05-06 VITALS
BODY MASS INDEX: 23.99 KG/M2 | SYSTOLIC BLOOD PRESSURE: 144 MMHG | HEIGHT: 59 IN | HEART RATE: 67 BPM | DIASTOLIC BLOOD PRESSURE: 65 MMHG | WEIGHT: 119 LBS | TEMPERATURE: 96 F | RESPIRATION RATE: 14 BRPM

## 2019-05-06 DIAGNOSIS — C50.511 MALIGNANT NEOPLASM OF LOWER-OUTER QUADRANT OF RIGHT FEMALE BREAST: ICD-10-CM

## 2019-05-06 DIAGNOSIS — Z90.49 ACQUIRED ABSENCE OF OTHER SPECIFIED PARTS OF DIGESTIVE TRACT: Chronic | ICD-10-CM

## 2019-05-06 LAB
HCT VFR BLD CALC: 38.1 %
HGB BLD-MCNC: 12.1 G/DL
MCHC RBC-ENTMCNC: 27.9 PG
MCHC RBC-ENTMCNC: 31.8 G/DL
MCV RBC AUTO: 87.8 FL
PLATELET # BLD AUTO: 216 K/UL
PMV BLD: 9.4 FL
RBC # BLD: 4.34 M/UL
RBC # FLD: 14.6 %
WBC # FLD AUTO: 5.36 K/UL

## 2019-05-06 NOTE — CONSULT LETTER
[Consult Letter:] : I had the pleasure of evaluating your patient, [unfilled]. [Dear  ___] : Dear  [unfilled], [DrHoracio  ___] : Dr. TRUONG

## 2019-05-07 LAB
ALBUMIN SERPL ELPH-MCNC: 3.9 G/DL
ALP BLD-CCNC: 125 U/L
ALT SERPL-CCNC: 15 U/L
ANION GAP SERPL CALC-SCNC: 13 MMOL/L
AST SERPL-CCNC: 25 U/L
BILIRUB SERPL-MCNC: 0.5 MG/DL
BUN SERPL-MCNC: 16 MG/DL
CALCIUM SERPL-MCNC: 9.9 MG/DL
CANCER AG15-3 SERPL-ACNC: 44.6 U/ML
CANCER AG27-29 SERPL-ACNC: 48.9 U/ML
CEA SERPL-MCNC: 2.6 NG/ML
CHLORIDE SERPL-SCNC: 99 MMOL/L
CO2 SERPL-SCNC: 29 MMOL/L
CREAT SERPL-MCNC: 0.8 MG/DL
GLUCOSE SERPL-MCNC: 115 MG/DL
POTASSIUM SERPL-SCNC: 3.9 MMOL/L
PROT SERPL-MCNC: 6.9 G/DL
SODIUM SERPL-SCNC: 141 MMOL/L

## 2019-05-07 NOTE — HISTORY OF PRESENT ILLNESS
[de-identified] : 89 year old white female referred by Dr Jaimes for newly diagnosed breast cancer . PMH significant for colon cancer 25 year ago , hypertension and hyperlipidemia. She was in her usual state of health until recently when she complained of left arm pain and developed pathologic fracture of left humerus . In January 2019 , she underwent open reduction and internal fixation of the fracture at [place] [facility] and was transferred for subacute rehab . Biopsy was consistent with adenocarcinoma of breast ER and [place] strongly positive , HER2 negative . 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 The patient had noted a right breast mass for few years and did not seek medical attention .Since surgery she complains of mild left arm pain , she is progressing with therapy and able to ambulate using a cane . She lost few lbs and denies abdominal pain , cough , SOB , breast discharge or bleeding .Lab work significant for mild normocytic anemia . The patient lives in apartment in her daughter's house and is capable of limited self care .  [de-identified] : 3/4/19:\par Discharged home from NH.\par Doing well. No major complaints.\par Denies fever, nausea, vomiting, chest pain, SOB, abdominal pain, bowel and bladder problems.\par On Letrozole since feb 2019. \par Seeing  on Wednesday. \par Left shoulder ROM restricted. \par \par 4/8/19:\par Doing well. No major complaints.\par Denies fever, nausea, vomiting, chest pain, SOB, abdominal pain, bowel and bladder problems.\par On Letrozole since feb 2019. \par Appt with  on Friday. S/p 1 session RT to left humerus. \par Left shoulder ROM restricted\par \par 5/6/19:\par Doing well. No major complaints.\par Denies fever, nausea, vomiting, chest pain, SOB, abdominal pain, bowel and bladder problems.\par On Letrozole since feb 2019. \par \par CT C/A/P in 04/2019 showed Asymmetric soft tissue mass, right breast compatible with clinical \par diagnosis of breast cancer. (Series 2/30). No axillary adenopathy.,\par Post pinning, left humeral fracture.  At least 2 hypodense right hepatic lobe masses, suspicious for \par metastases.    No abdominopelvic lymphadenopathy.\par \par Went to ER for dizziness. CT head normal. \par

## 2019-05-07 NOTE — ASSESSMENT
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 since feb 2019. Tolerating well.\par - S/p 1 fraction of  palliative RT to left humerus. F/u with  \par - CT C/A/P  in 04/2019 showed Asymmetric soft tissue mass, right breast and 2 hypodense right hepatic lobe masses, suspicious for metastases.  \par - Will decide on bisphosphonate treatment during the next visit.\par - Labs ordered including tumor markers. Tumor markers improving. \par - Discussed about combination Faslodex with AI treatments but it is hard for the pt to come every month for faslodex injections. \par \par Mild anemia: Monitor for now. \par \par RTC in 1 month.\par Pt seen and examined with \par

## 2019-05-07 NOTE — PHYSICAL EXAM
[Capable of only limited self care, confined to bed or chair more than 50% of waking hours] : Status 3- Capable of only limited self care, confined to bed or chair more than 50% of waking hours [Normal] : no peripheral adenopathy appreciated [de-identified] : well preserved elderly female , alert , oriented . speech fluent .  [de-identified] : large right breast massin inner quadrants with skin and nipple involvement  , appears much smaller , no ulcerations or fungation. no axillary adenopathy .

## 2019-07-08 ENCOUNTER — APPOINTMENT (OUTPATIENT)
Dept: HEMATOLOGY ONCOLOGY | Facility: CLINIC | Age: 84
End: 2019-07-08

## 2019-07-29 ENCOUNTER — APPOINTMENT (OUTPATIENT)
Dept: HEMATOLOGY ONCOLOGY | Facility: CLINIC | Age: 84
End: 2019-07-29
Payer: MEDICARE

## 2019-07-29 ENCOUNTER — LABORATORY RESULT (OUTPATIENT)
Age: 84
End: 2019-07-29

## 2019-07-29 VITALS
DIASTOLIC BLOOD PRESSURE: 72 MMHG | BODY MASS INDEX: 23.99 KG/M2 | RESPIRATION RATE: 14 BRPM | TEMPERATURE: 96.8 F | HEART RATE: 75 BPM | HEIGHT: 59 IN | WEIGHT: 119 LBS | SYSTOLIC BLOOD PRESSURE: 154 MMHG

## 2019-07-29 LAB
HCT VFR BLD CALC: 39.5 %
HGB BLD-MCNC: 12.7 G/DL
MCHC RBC-ENTMCNC: 29.7 PG
MCHC RBC-ENTMCNC: 32.2 G/DL
MCV RBC AUTO: 92.3 FL
PLATELET # BLD AUTO: 204 K/UL
PMV BLD: 9.3 FL
RBC # BLD: 4.28 M/UL
RBC # FLD: 14.7 %
WBC # FLD AUTO: 6.02 K/UL

## 2019-07-29 PROCEDURE — 99214 OFFICE O/P EST MOD 30 MIN: CPT

## 2019-07-30 LAB
ALBUMIN SERPL ELPH-MCNC: 4 G/DL
ALP BLD-CCNC: 206 U/L
ALT SERPL-CCNC: 26 U/L
ANION GAP SERPL CALC-SCNC: 12 MMOL/L
AST SERPL-CCNC: 40 U/L
BILIRUB SERPL-MCNC: 0.5 MG/DL
BUN SERPL-MCNC: 13 MG/DL
CALCIUM SERPL-MCNC: 9.7 MG/DL
CANCER AG15-3 SERPL-ACNC: 50.3 U/ML
CANCER AG27-29 SERPL-ACNC: 48.4 U/ML
CEA SERPL-MCNC: 2.8 NG/ML
CHLORIDE SERPL-SCNC: 99 MMOL/L
CO2 SERPL-SCNC: 30 MMOL/L
CREAT SERPL-MCNC: 0.8 MG/DL
GLUCOSE SERPL-MCNC: 97 MG/DL
POTASSIUM SERPL-SCNC: 4 MMOL/L
PROT SERPL-MCNC: 7.8 G/DL
SODIUM SERPL-SCNC: 141 MMOL/L

## 2019-07-30 NOTE — ASSESSMENT
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 since feb 2019. Tolerating well.\par - S/p 1 fraction of  palliative RT to left humerus. F/u with  \par - CT C/A/P  in 04/2019 showed Asymmetric soft tissue mass, right breast and 2 hypodense right hepatic lobe masses, suspicious for metastases.  \par - Pt does not want zometa or xgeva\par - Labs ordered including tumor markers. Tumor markers improving. \par \par RTC in 3 months. Will order PET scan on that visit\par \par

## 2019-07-30 NOTE — HISTORY OF PRESENT ILLNESS
[de-identified] : 89 year old white female referred by Dr Jaimes for newly diagnosed breast cancer . PMH significant for colon cancer 25 year ago , hypertension and hyperlipidemia. She was in her usual state of health until recently when she complained of left arm pain and developed pathologic fracture of left humerus . In January 2019 , she underwent open reduction and internal fixation of the fracture at [facility] and was transferred for subacute rehab . Biopsy was consistent with adenocarcinoma of breast ER and NC strongly positive , HER2 negative . 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 The patient had noted a right breast mass for few years and did not seek medical attention .Since surgery she complains of mild left arm pain , she is progressing with therapy and able to ambulate using a cane . She lost few lbs and denies abdominal pain , cough , SOB , breast discharge or bleeding .Lab work significant for mild normocytic anemia . The patient lives in apartment in her daughter's house and is capable of limited self care .  [de-identified] : 3/4/19:\par Discharged home from NH.\par Doing well. No major complaints.\par Denies fever, nausea, vomiting, chest pain, SOB, abdominal pain, bowel and bladder problems.\par On Letrozole since feb 2019. \par Seeing  on Wednesday. \par Left shoulder ROM restricted. \par \par 4/8/19:\par Doing well. No major complaints.\par Denies fever, nausea, vomiting, chest pain, SOB, abdominal pain, bowel and bladder problems.\par On Letrozole since feb 2019. \par Appt with  on Friday. S/p 1 session RT to left humerus. \par Left shoulder ROM restricted\par \par 5/6/19:\par Doing well. No major complaints.\par Denies fever, nausea, vomiting, chest pain, SOB, abdominal pain, bowel and bladder problems.\par On Letrozole since feb 2019. \par \par CT C/A/P in 04/2019 showed Asymmetric soft tissue mass, right breast compatible with clinical \par diagnosis of breast cancer. (Series 2/30). No axillary adenopathy.,\par Post pinning, left humeral fracture.  At least 2 hypodense right hepatic lobe masses, suspicious for \par metastases.    No abdominopelvic lymphadenopathy.\par \par Went to ER for dizziness. CT head normal. \par \par 7/29/19: Pt returns for a f/u visit. She has no fresh complaints other than ongoing left arm pain. She has been tolerating letrozole fairly well. \par

## 2019-07-30 NOTE — PHYSICAL EXAM
[Capable of only limited self care, confined to bed or chair more than 50% of waking hours] : Status 3- Capable of only limited self care, confined to bed or chair more than 50% of waking hours [Normal] : no peripheral adenopathy appreciated [de-identified] : well preserved elderly female , alert , oriented . speech fluent .  [de-identified] : right breast mass in inner quadrants with skin and nipple involvement  , appears much smaller , no ulcerations or fungation. no axillary adenopathy .

## 2019-07-30 NOTE — REASON FOR VISIT
[Family Member] : family member [Follow-Up Visit] : a follow-up [FreeTextEntry2] : Metastatic breast cancer

## 2019-10-31 ENCOUNTER — APPOINTMENT (OUTPATIENT)
Dept: HEMATOLOGY ONCOLOGY | Facility: CLINIC | Age: 84
End: 2019-10-31
Payer: MEDICARE

## 2019-10-31 ENCOUNTER — OUTPATIENT (OUTPATIENT)
Dept: OUTPATIENT SERVICES | Facility: HOSPITAL | Age: 84
LOS: 1 days | Discharge: HOME | End: 2019-10-31

## 2019-10-31 ENCOUNTER — LABORATORY RESULT (OUTPATIENT)
Age: 84
End: 2019-10-31

## 2019-10-31 VITALS
DIASTOLIC BLOOD PRESSURE: 70 MMHG | HEART RATE: 69 BPM | RESPIRATION RATE: 14 BRPM | HEIGHT: 59 IN | BODY MASS INDEX: 24.6 KG/M2 | SYSTOLIC BLOOD PRESSURE: 131 MMHG | TEMPERATURE: 96.8 F | WEIGHT: 122 LBS

## 2019-10-31 DIAGNOSIS — C50.919 MALIGNANT NEOPLASM OF UNSPECIFIED SITE OF UNSPECIFIED FEMALE BREAST: ICD-10-CM

## 2019-10-31 DIAGNOSIS — Z90.49 ACQUIRED ABSENCE OF OTHER SPECIFIED PARTS OF DIGESTIVE TRACT: Chronic | ICD-10-CM

## 2019-10-31 PROCEDURE — 99214 OFFICE O/P EST MOD 30 MIN: CPT

## 2019-10-31 NOTE — HISTORY OF PRESENT ILLNESS
[de-identified] : \par 89 year old white female referred by Dr Jaimes for newly diagnosed breast cancer. PMH significant for colon cancer 25 year ago , hypertension and hyperlipidemia. She was in her usual state of health until recently when she complained of left arm pain and developed pathologic fracture of left humerus. In January 2019 , she underwent open reduction and internal fixation of the fracture at [facility] and was transferred for subacute rehab. Biopsy was consistent with adenocarcinoma of breast ER and RI strongly positive , HER2 negative. 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 The patient had noted a right breast mass for few years and did not seek medical attention.Since surgery she complains of mild left arm pain , she is progressing with therapy and able to ambulate using a cane. She lost few lbs and denies abdominal pain , cough , SOB , breast discharge or bleeding.Lab work significant for mild normocytic anemia. The patient lives in apartment in her daughter's house and is capable of limited self care. \par \par  [de-identified] : 10/31/2019 patient returns for follow up , she continues on femara and offers no complaints , no skeletal pain , headaches , cough or SOB . Last tumor markers are stable .  How Severe Is Your Cyst?: mild Is This A New Presentation, Or A Follow-Up?: Cyst Additional History: Interested in discussing possible removal. Was surgically removed in the past (10+ years ago) and recurred

## 2019-10-31 NOTE — PHYSICAL EXAM
[Normal] : full range of motion and no deformities appreciated [de-identified] : well preserved elderly female , alert , oriented . speech fluent .  [de-identified] : residual  right breast mass in inner quadrants with skin retraction   , appears much smaller , non ulcerated or fungating. . no axillary adenopathy .

## 2019-10-31 NOTE — ASSESSMENT
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 \par \par PLAN:\par - Continue Letrozole. since feb 2019. Tolerating well.\par -  ultrasound of right breast ( no prior for comparison ? ) \par refusing other imaging studies for now . \par \par Mild anemia: Monitor for now. \par \par RTC in 3 month.\par \par

## 2019-11-01 LAB
ALBUMIN SERPL ELPH-MCNC: 3.6 G/DL
ALP BLD-CCNC: 128 U/L
ALT SERPL-CCNC: 14 U/L
ANION GAP SERPL CALC-SCNC: 13 MMOL/L
AST SERPL-CCNC: 30 U/L
BILIRUB SERPL-MCNC: 0.4 MG/DL
BUN SERPL-MCNC: 11 MG/DL
CALCIUM SERPL-MCNC: 9 MG/DL
CANCER AG15-3 SERPL-ACNC: 81.1 U/ML
CANCER AG27-29 SERPL-ACNC: 82.3 U/ML
CEA SERPL-MCNC: 2.7 NG/ML
CHLORIDE SERPL-SCNC: 99 MMOL/L
CO2 SERPL-SCNC: 29 MMOL/L
CREAT SERPL-MCNC: 0.8 MG/DL
GLUCOSE SERPL-MCNC: 132 MG/DL
HCT VFR BLD CALC: 36.5 %
HGB BLD-MCNC: 11.9 G/DL
MCHC RBC-ENTMCNC: 29.7 PG
MCHC RBC-ENTMCNC: 32.6 G/DL
MCV RBC AUTO: 91 FL
PLATELET # BLD AUTO: 215 K/UL
PMV BLD: 9.2 FL
POTASSIUM SERPL-SCNC: 3.6 MMOL/L
PROT SERPL-MCNC: 6.9 G/DL
RBC # BLD: 4.01 M/UL
RBC # FLD: 14 %
SODIUM SERPL-SCNC: 141 MMOL/L
WBC # FLD AUTO: 6.04 K/UL

## 2020-02-19 DIAGNOSIS — M25.511 PAIN IN RIGHT SHOULDER: ICD-10-CM

## 2020-02-24 ENCOUNTER — FORM ENCOUNTER (OUTPATIENT)
Age: 85
End: 2020-02-24

## 2020-02-25 ENCOUNTER — OUTPATIENT (OUTPATIENT)
Dept: OUTPATIENT SERVICES | Facility: HOSPITAL | Age: 85
LOS: 1 days | Discharge: HOME | End: 2020-02-25
Payer: MEDICARE

## 2020-02-25 DIAGNOSIS — C50.919 MALIGNANT NEOPLASM OF UNSPECIFIED SITE OF UNSPECIFIED FEMALE BREAST: ICD-10-CM

## 2020-02-25 DIAGNOSIS — Z85.3 PERSONAL HISTORY OF MALIGNANT NEOPLASM OF BREAST: ICD-10-CM

## 2020-02-25 DIAGNOSIS — M25.511 PAIN IN RIGHT SHOULDER: ICD-10-CM

## 2020-02-25 DIAGNOSIS — Z90.49 ACQUIRED ABSENCE OF OTHER SPECIFIED PARTS OF DIGESTIVE TRACT: Chronic | ICD-10-CM

## 2020-02-25 DIAGNOSIS — R92.8 OTHER ABNORMAL AND INCONCLUSIVE FINDINGS ON DIAGNOSTIC IMAGING OF BREAST: ICD-10-CM

## 2020-02-25 PROCEDURE — 73030 X-RAY EXAM OF SHOULDER: CPT | Mod: 26,RT

## 2020-02-25 PROCEDURE — G0279: CPT | Mod: 26

## 2020-02-25 PROCEDURE — 76641 ULTRASOUND BREAST COMPLETE: CPT | Mod: 26,50

## 2020-02-25 PROCEDURE — 77066 DX MAMMO INCL CAD BI: CPT | Mod: 26

## 2020-02-27 DIAGNOSIS — Z02.9 ENCOUNTER FOR ADMINISTRATIVE EXAMINATIONS, UNSPECIFIED: ICD-10-CM

## 2020-02-27 DIAGNOSIS — M25.511 PAIN IN RIGHT SHOULDER: ICD-10-CM

## 2020-03-09 ENCOUNTER — APPOINTMENT (OUTPATIENT)
Dept: HEMATOLOGY ONCOLOGY | Facility: CLINIC | Age: 85
End: 2020-03-09
Payer: MEDICARE

## 2020-03-09 ENCOUNTER — LABORATORY RESULT (OUTPATIENT)
Age: 85
End: 2020-03-09

## 2020-03-09 ENCOUNTER — OUTPATIENT (OUTPATIENT)
Dept: OUTPATIENT SERVICES | Facility: HOSPITAL | Age: 85
LOS: 1 days | Discharge: HOME | End: 2020-03-09

## 2020-03-09 VITALS
BODY MASS INDEX: 24.19 KG/M2 | SYSTOLIC BLOOD PRESSURE: 157 MMHG | HEART RATE: 79 BPM | RESPIRATION RATE: 14 BRPM | HEIGHT: 59 IN | DIASTOLIC BLOOD PRESSURE: 79 MMHG | WEIGHT: 120 LBS | TEMPERATURE: 96.5 F

## 2020-03-09 DIAGNOSIS — Z90.49 ACQUIRED ABSENCE OF OTHER SPECIFIED PARTS OF DIGESTIVE TRACT: Chronic | ICD-10-CM

## 2020-03-09 DIAGNOSIS — C50.919 MALIGNANT NEOPLASM OF UNSPECIFIED SITE OF UNSPECIFIED FEMALE BREAST: ICD-10-CM

## 2020-03-09 LAB
HCT VFR BLD CALC: 37.1 %
HGB BLD-MCNC: 12.1 G/DL
MCHC RBC-ENTMCNC: 29.6 PG
MCHC RBC-ENTMCNC: 32.6 G/DL
MCV RBC AUTO: 90.7 FL
PLATELET # BLD AUTO: 208 K/UL
PMV BLD: 9.2 FL
RBC # BLD: 4.09 M/UL
RBC # FLD: 14.4 %
WBC # FLD AUTO: 7.72 K/UL

## 2020-03-09 PROCEDURE — 99214 OFFICE O/P EST MOD 30 MIN: CPT

## 2020-03-09 RX ORDER — LETROZOLE TABLETS 2.5 MG/1
2.5 TABLET, FILM COATED ORAL DAILY
Qty: 90 | Refills: 1 | Status: ACTIVE | COMMUNITY
Start: 2019-03-11 | End: 1900-01-01

## 2020-03-10 LAB
ALBUMIN SERPL ELPH-MCNC: 3.9 G/DL
ALP BLD-CCNC: 96 U/L
ALT SERPL-CCNC: 10 U/L
ANION GAP SERPL CALC-SCNC: 12 MMOL/L
AST SERPL-CCNC: 26 U/L
BILIRUB SERPL-MCNC: 0.6 MG/DL
BUN SERPL-MCNC: 13 MG/DL
CALCIUM SERPL-MCNC: 9.1 MG/DL
CANCER AG15-3 SERPL-ACNC: 178 U/ML
CEA SERPL-MCNC: 3.6 NG/ML
CHLORIDE SERPL-SCNC: 98 MMOL/L
CO2 SERPL-SCNC: 28 MMOL/L
CREAT SERPL-MCNC: 0.7 MG/DL
GLUCOSE SERPL-MCNC: 81 MG/DL
POTASSIUM SERPL-SCNC: 4 MMOL/L
PROT SERPL-MCNC: 7.2 G/DL
SODIUM SERPL-SCNC: 138 MMOL/L

## 2020-03-10 NOTE — HISTORY OF PRESENT ILLNESS
[de-identified] : 10/31/2019 patient returns for follow up , she continues on femara and offers no complaints , no skeletal pain , headaches , cough or SOB . Last tumor markers are stable . \par \par 03/09/2020:\par Patient is here for a follow up visit. She is taking Femara daily. \par US breast and Mammogram 02/2020 showed stable bilateral breast masses. She is complaining of pain in her right arm. Right X ray showed osteopenia. \par She offers no other complaints other than the pain. \par Her tumor markers- CA 15-3  increased to 81 in october 2019. \par  [de-identified] : \par 89 year old white female referred by Dr Jaimes for newly diagnosed breast cancer. PMH significant for colon cancer 25 year ago , hypertension and hyperlipidemia. She was in her usual state of health until recently when she complained of left arm pain and developed pathologic fracture of left humerus. In January 2019 , she underwent open reduction and internal fixation of the fracture at [facility] and was transferred for subacute rehab. Biopsy was consistent with adenocarcinoma of breast ER and DC strongly positive , HER2 negative. 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 The patient had noted a right breast mass for few years and did not seek medical attention.Since surgery she complains of mild left arm pain , she is progressing with therapy and able to ambulate using a cane. She lost few lbs and denies abdominal pain , cough , SOB , breast discharge or bleeding.Lab work significant for mild normocytic anemia. The patient lives in apartment in her daughter's house and is capable of limited self care. \par \par

## 2020-03-10 NOTE — ASSESSMENT
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 \par US breast/Mammogram shows stable B/L breast masses. \par Persistent Right arm pain. X -ray of shoulder showed no fracture.\par Refusing CT imaging.\par \par PLAN:\par -Bone scan. If progression will consider switching to exemestane or Falsodex IM with or without CD4/6 inhibitor. \par -Until the bone scan results continue letrozole.\par \par RTC in 3 month.\par \par

## 2020-03-10 NOTE — PHYSICAL EXAM
[Normal] : full range of motion and no deformities appreciated [de-identified] : well preserved elderly female , alert , oriented . speech fluent .  [de-identified] : residual  right breast mass in inner quadrants with skin retraction   , appears much smaller , non ulcerated or fungating. . no axillary adenopathy .

## 2020-03-12 DIAGNOSIS — C50.919 MALIGNANT NEOPLASM OF UNSPECIFIED SITE OF UNSPECIFIED FEMALE BREAST: ICD-10-CM

## 2020-05-12 ENCOUNTER — APPOINTMENT (OUTPATIENT)
Dept: HEMATOLOGY ONCOLOGY | Facility: CLINIC | Age: 85
End: 2020-05-12

## 2020-05-25 ENCOUNTER — INPATIENT (INPATIENT)
Facility: HOSPITAL | Age: 85
LOS: 3 days | Discharge: HOSPICE | End: 2020-05-29
Attending: STUDENT IN AN ORGANIZED HEALTH CARE EDUCATION/TRAINING PROGRAM | Admitting: STUDENT IN AN ORGANIZED HEALTH CARE EDUCATION/TRAINING PROGRAM
Payer: MEDICARE

## 2020-05-25 VITALS
TEMPERATURE: 99 F | RESPIRATION RATE: 18 BRPM | DIASTOLIC BLOOD PRESSURE: 76 MMHG | SYSTOLIC BLOOD PRESSURE: 164 MMHG | OXYGEN SATURATION: 94 % | HEART RATE: 96 BPM

## 2020-05-25 DIAGNOSIS — Z90.49 ACQUIRED ABSENCE OF OTHER SPECIFIED PARTS OF DIGESTIVE TRACT: Chronic | ICD-10-CM

## 2020-05-25 LAB
ALBUMIN SERPL ELPH-MCNC: 3.1 G/DL — LOW (ref 3.5–5.2)
ALP SERPL-CCNC: 155 U/L — HIGH (ref 30–115)
ALT FLD-CCNC: 19 U/L — SIGNIFICANT CHANGE UP (ref 0–41)
ANION GAP SERPL CALC-SCNC: 10 MMOL/L — SIGNIFICANT CHANGE UP (ref 7–14)
APPEARANCE UR: ABNORMAL
APTT BLD: 28.5 SEC — SIGNIFICANT CHANGE UP (ref 27–39.2)
AST SERPL-CCNC: 42 U/L — HIGH (ref 0–41)
BACTERIA # UR AUTO: ABNORMAL
BASOPHILS # BLD AUTO: 0.03 K/UL — SIGNIFICANT CHANGE UP (ref 0–0.2)
BASOPHILS NFR BLD AUTO: 0.3 % — SIGNIFICANT CHANGE UP (ref 0–1)
BILIRUB SERPL-MCNC: 0.8 MG/DL — SIGNIFICANT CHANGE UP (ref 0.2–1.2)
BILIRUB UR-MCNC: NEGATIVE — SIGNIFICANT CHANGE UP
BLD GP AB SCN SERPL QL: SIGNIFICANT CHANGE UP
BUN SERPL-MCNC: 10 MG/DL — SIGNIFICANT CHANGE UP (ref 10–20)
CALCIUM SERPL-MCNC: 9.1 MG/DL — SIGNIFICANT CHANGE UP (ref 8.5–10.1)
CHLORIDE SERPL-SCNC: 94 MMOL/L — LOW (ref 98–110)
CK SERPL-CCNC: 56 U/L — SIGNIFICANT CHANGE UP (ref 0–225)
CO2 SERPL-SCNC: 28 MMOL/L — SIGNIFICANT CHANGE UP (ref 17–32)
COLOR SPEC: ABNORMAL
COMMENT - URINE: SIGNIFICANT CHANGE UP
CREAT SERPL-MCNC: 0.9 MG/DL — SIGNIFICANT CHANGE UP (ref 0.7–1.5)
DIFF PNL FLD: ABNORMAL
EOSINOPHIL # BLD AUTO: 0.02 K/UL — SIGNIFICANT CHANGE UP (ref 0–0.7)
EOSINOPHIL NFR BLD AUTO: 0.2 % — SIGNIFICANT CHANGE UP (ref 0–8)
ETHANOL SERPL-MCNC: <10 MG/DL — SIGNIFICANT CHANGE UP
GLUCOSE SERPL-MCNC: 134 MG/DL — HIGH (ref 70–99)
GLUCOSE UR QL: NEGATIVE — SIGNIFICANT CHANGE UP
HCT VFR BLD CALC: 35.7 % — LOW (ref 37–47)
HGB BLD-MCNC: 11.4 G/DL — LOW (ref 12–16)
IMM GRANULOCYTES NFR BLD AUTO: 0.5 % — HIGH (ref 0.1–0.3)
INR BLD: 1.17 RATIO — SIGNIFICANT CHANGE UP (ref 0.65–1.3)
KETONES UR-MCNC: NEGATIVE — SIGNIFICANT CHANGE UP
LACTATE SERPL-SCNC: 1 MMOL/L — SIGNIFICANT CHANGE UP (ref 0.7–2)
LEUKOCYTE ESTERASE UR-ACNC: ABNORMAL
LIDOCAIN IGE QN: 17 U/L — SIGNIFICANT CHANGE UP (ref 7–60)
LYMPHOCYTES # BLD AUTO: 0.74 K/UL — LOW (ref 1.2–3.4)
LYMPHOCYTES # BLD AUTO: 6.9 % — LOW (ref 20.5–51.1)
MCHC RBC-ENTMCNC: 28.1 PG — SIGNIFICANT CHANGE UP (ref 27–31)
MCHC RBC-ENTMCNC: 31.9 G/DL — LOW (ref 32–37)
MCV RBC AUTO: 87.9 FL — SIGNIFICANT CHANGE UP (ref 81–99)
MONOCYTES # BLD AUTO: 0.87 K/UL — HIGH (ref 0.1–0.6)
MONOCYTES NFR BLD AUTO: 8.1 % — SIGNIFICANT CHANGE UP (ref 1.7–9.3)
NEUTROPHILS # BLD AUTO: 9 K/UL — HIGH (ref 1.4–6.5)
NEUTROPHILS NFR BLD AUTO: 84 % — HIGH (ref 42.2–75.2)
NITRITE UR-MCNC: NEGATIVE — SIGNIFICANT CHANGE UP
NRBC # BLD: 0 /100 WBCS — SIGNIFICANT CHANGE UP (ref 0–0)
PH UR: 7 — SIGNIFICANT CHANGE UP (ref 5–8)
PLATELET # BLD AUTO: 394 K/UL — SIGNIFICANT CHANGE UP (ref 130–400)
POTASSIUM SERPL-MCNC: 4.2 MMOL/L — SIGNIFICANT CHANGE UP (ref 3.5–5)
POTASSIUM SERPL-SCNC: 4.2 MMOL/L — SIGNIFICANT CHANGE UP (ref 3.5–5)
PROT SERPL-MCNC: 6.5 G/DL — SIGNIFICANT CHANGE UP (ref 6–8)
PROT UR-MCNC: >=300
PROTHROM AB SERPL-ACNC: 13.5 SEC — HIGH (ref 9.95–12.87)
RBC # BLD: 4.06 M/UL — LOW (ref 4.2–5.4)
RBC # FLD: 14.2 % — SIGNIFICANT CHANGE UP (ref 11.5–14.5)
RBC CASTS # UR COMP ASSIST: >50 /HPF
SARS-COV-2 RNA SPEC QL NAA+PROBE: SIGNIFICANT CHANGE UP
SODIUM SERPL-SCNC: 132 MMOL/L — LOW (ref 135–146)
SP GR SPEC: 1.02 — SIGNIFICANT CHANGE UP (ref 1.01–1.03)
UROBILINOGEN FLD QL: 0.2 — SIGNIFICANT CHANGE UP (ref 0.2–0.2)
WBC # BLD: 10.71 K/UL — SIGNIFICANT CHANGE UP (ref 4.8–10.8)
WBC # FLD AUTO: 10.71 K/UL — SIGNIFICANT CHANGE UP (ref 4.8–10.8)

## 2020-05-25 PROCEDURE — 72125 CT NECK SPINE W/O DYE: CPT | Mod: 26

## 2020-05-25 PROCEDURE — 70450 CT HEAD/BRAIN W/O DYE: CPT | Mod: 26

## 2020-05-25 PROCEDURE — 74177 CT ABD & PELVIS W/CONTRAST: CPT | Mod: 26

## 2020-05-25 PROCEDURE — 99291 CRITICAL CARE FIRST HOUR: CPT | Mod: CS

## 2020-05-25 PROCEDURE — 71045 X-RAY EXAM CHEST 1 VIEW: CPT | Mod: 26

## 2020-05-25 PROCEDURE — 99291 CRITICAL CARE FIRST HOUR: CPT

## 2020-05-25 PROCEDURE — 71260 CT THORAX DX C+: CPT | Mod: 26

## 2020-05-25 RX ORDER — MULTIVIT-MIN/FERROUS GLUCONATE 9 MG/15 ML
1 LIQUID (ML) ORAL
Qty: 0 | Refills: 0 | DISCHARGE

## 2020-05-25 RX ORDER — IBUPROFEN 200 MG
400 TABLET ORAL EVERY 6 HOURS
Refills: 0 | Status: DISCONTINUED | OUTPATIENT
Start: 2020-05-25 | End: 2020-05-29

## 2020-05-25 RX ORDER — MECLIZINE HCL 12.5 MG
25 TABLET ORAL EVERY 8 HOURS
Refills: 0 | Status: DISCONTINUED | OUTPATIENT
Start: 2020-05-25 | End: 2020-05-27

## 2020-05-25 RX ORDER — PANTOPRAZOLE SODIUM 20 MG/1
40 TABLET, DELAYED RELEASE ORAL
Refills: 0 | Status: DISCONTINUED | OUTPATIENT
Start: 2020-05-25 | End: 2020-05-29

## 2020-05-25 RX ORDER — ONDANSETRON 8 MG/1
4 TABLET, FILM COATED ORAL ONCE
Refills: 0 | Status: DISCONTINUED | OUTPATIENT
Start: 2020-05-25 | End: 2020-05-25

## 2020-05-25 RX ORDER — CHLORHEXIDINE GLUCONATE 213 G/1000ML
1 SOLUTION TOPICAL
Refills: 0 | Status: DISCONTINUED | OUTPATIENT
Start: 2020-05-25 | End: 2020-05-29

## 2020-05-25 RX ORDER — ACETAMINOPHEN 500 MG
975 TABLET ORAL ONCE
Refills: 0 | Status: COMPLETED | OUTPATIENT
Start: 2020-05-25 | End: 2020-05-25

## 2020-05-25 RX ORDER — ENOXAPARIN SODIUM 100 MG/ML
40 INJECTION SUBCUTANEOUS DAILY
Refills: 0 | Status: DISCONTINUED | OUTPATIENT
Start: 2020-05-25 | End: 2020-05-29

## 2020-05-25 RX ORDER — SIMVASTATIN 20 MG/1
40 TABLET, FILM COATED ORAL AT BEDTIME
Refills: 0 | Status: DISCONTINUED | OUTPATIENT
Start: 2020-05-25 | End: 2020-05-29

## 2020-05-25 RX ADMIN — Medication 975 MILLIGRAM(S): at 06:52

## 2020-05-25 RX ADMIN — Medication 25 MILLIGRAM(S): at 21:18

## 2020-05-25 RX ADMIN — SIMVASTATIN 40 MILLIGRAM(S): 20 TABLET, FILM COATED ORAL at 21:18

## 2020-05-25 NOTE — ED PROVIDER NOTE - CARE PLAN
Principal Discharge DX:	Fall  Secondary Diagnosis:	Rib pain on left side  Secondary Diagnosis:	Back pain Principal Discharge DX:	Intraabdominal hemorrhage  Secondary Diagnosis:	Rib pain on left side  Secondary Diagnosis:	Accidental fall  Secondary Diagnosis:	Flank pain, acute Principal Discharge DX:	Closed fracture of multiple ribs of left side, initial encounter  Secondary Diagnosis:	Fall, initial encounter  Secondary Diagnosis:	Hematuria

## 2020-05-25 NOTE — H&P ADULT - NSHPPHYSICALEXAM_GEN_ALL_CORE
GENERAL: NAD, lying in bed comfortably  HEAD:  Atraumatic, Normocephalic  EYES: EOMI, PERRLA, conjunctiva and sclera clear  ENT: Moist mucous membranes  NECK: Supple, No JVD  CHEST/LUNG: Clear to auscultation bilaterally  HEART: Regular rate and rhythm; No murmurs, rubs, or gallops  ABDOMEN: Soft, Nontender, Nondistended.  EXTREMITIES:  2+ Peripheral Pulses, brisk capillary refill. No clubbing, cyanosis, or edema  NERVOUS SYSTEM:  Alert & Oriented X3, speech clear. No deficits   MSK: FROM all 4 extremities, full and equal strength  SKIN: No rashes or lesions

## 2020-05-25 NOTE — PHYSICAL THERAPY INITIAL EVALUATION ADULT - SPECIFY REASON(S)
pt declined despite max encouragement. Pt was educated about the importance/benefits of participating in PT and being OOB.  pt continued to decline.

## 2020-05-25 NOTE — CONSULT NOTE ADULT - SUBJECTIVE AND OBJECTIVE BOX
89y f    TRAUMA ACTIVATION LEVEL:  Trauma Transfer upgraded to Code trauma    MECHANISM OF INJURY:      [] Blunt  	[] MVC	[x] Fall	[] Pedestrian Struck	[] Motorcycle   [] Assault   [] Bicycle collision  [] Sports injury     [] Penetrating  	[] Gun Shot Wound 		[] Stab Wound    GCS: 	E: 4	V: 5	M: 6      HPI: 89y old f s/p Fall in bathroom, +ht, -loc, -ac. The patients daughter heard the fall and called the ambulance. The patient was taken to ED south where free fluid was seen on FAST, transferred north for trauma evaluation, upgraded at south based on positive fast. The patient reports no pain and has no external signs of trauma. CT scan review showed a history of right hepatic lobe densities suspicious for malignancy.      PAST MEDICAL & SURGICAL HISTORY:  Vertigo  High cholesterol  HTN (hypertension)  History of partial colectomy      Allergies    No Known Allergies    Intolerances        Home Medications:  acetaminophen 325 mg oral tablet: 2 tab(s) orally every 6 hours, As needed, Temp greater or equal to 38C (100.4F), Moderate Pain (4 - 6) (2019 13:45)  aspirin 81 mg oral tablet, chewable: 1 tab(s) orally once a day (2019 15:57)  cefpodoxime 100 mg oral tablet: 1 tab(s) orally every 12 hours for 3 days (2019 13:45)  Centrum Silver oral tablet: 1 tab(s) orally once a day (2019 15:57)  losartan 50 mg oral tablet: 1 tab(s) orally once a day (2019 13:45)  meclizine 12.5 mg oral tablet: 2 tab(s) orally once a day (2019 13:45)  meclizine 25 mg oral tablet: 1 tab(s) orally every 12 hours (2019 13:45)  melatonin 3 mg oral tablet: 1 tab(s) orally once a day (at bedtime), As needed, Insomnia (2019 13:45)  omeprazole 20 mg oral delayed release capsule: orally 2 times a day (2019 15:57)  oxycodone-acetaminophen 5 mg-325 mg oral tablet: 2 tab(s) orally every 6 hours, As needed, Severe Pain (7 - 10) (2019 13:45)  simvastatin 40 mg oral tablet: 1 tab(s) orally once a day (at bedtime) (2019 15:57)      ROS: 10-system review is otherwise negative except HPI above.      Primary Survey:    A - airway intact  B - bilateral breath sounds and good chest rise  C - palpable pulses in all extremities  D - GCS 15 on arrival, PEREIRA  Exposure obtained    Vital Signs Last 24 Hrs  T(C): 36.9 (25 May 2020 08:14), Max: 37.1 (25 May 2020 06:12)  T(F): 98.4 (25 May 2020 08:14), Max: 98.7 (25 May 2020 06:12)  HR: 90 (25 May 2020 08:14) (90 - 96)  BP: 133/74 (25 May 2020 08:14) (114/58 - 164/76)  BP(mean): --  RR: 18 (25 May 2020 08:14) (17 - 18)  SpO2: 98% (25 May 2020 08:14) (94% - 98%)    Secondary Survey:   General: NAD  HEENT: Normocephalic, atraumatic, EOMI, PEERLA. no scalp lacerations   Neck: Soft, midline trachea. no cspine tenderness  Chest: No chest wall tenderness. or subq  emphysema   Cardiac: S1, S2, RRR  Respiratory: Bilateral breath sounds, clear and equal bilaterally  Abdomen: +large ventral hernia. Soft, non-distended, non-tender, no rebound,   Groin: Normal appearing, pelvis stable   Ext: palp radial b/l UE, b/l DP palp in Lower Extrem.   Back: no TTP, no palpable runoff/stepoff/deformity  Rectal: No sara blood, KAMERON with good tone    FAST : free fluid above the liver    Procedures:    LABS:  Labs:  CAPILLARY BLOOD GLUCOSE      POCT Blood Glucose.: 117 mg/dL (25 May 2020 08:19)                          11.4   10.71 )-----------( 394      ( 25 May 2020 06:21 )             35.7       Auto Neutrophil %: 84.0 % (20 @ 06:21)  Auto Immature Granulocyte %: 0.5 % (20 @ 06:21)        132<L>  |  94<L>  |  10  ----------------------------<  134<H>  4.2   |  28  |  0.9      Calcium, Total Serum: 9.1 mg/dL (20 @ 06:21)      LFTs:             6.5  | 0.8  | 42       ------------------[155     ( 25 May 2020 06:21 )  3.1  | x    | 19          Lipase:17     Amylase:x         Lactate, Blood: 1.0 mmol/L (20 @ 06:21)      Coags:     13.50  ----< 1.17    ( 25 May 2020 06:21 )     28.5              Alcohol, Blood: <10 mg/dL (20 @ 06:21)    Urinalysis Basic - ( 25 May 2020 06:21 )    Color: Red / Appearance: Cloudy / S.020 / pH: x  Gluc: x / Ketone: Negative  / Bili: Negative / Urobili: 0.2   Blood: x / Protein: >=300 / Nitrite: Negative   Leuk Esterase: Small / RBC: >50 /HPF / WBC x   Sq Epi: x / Non Sq Epi: x / Bacteria: Few            Alcohol, Blood: <10 mg/dL (20 @ 06:21)      RADIOLOGY & ADDITIONAL STUDIES:  < from: CT Head No Cont (20 @ 08:47) >  Impression:     No CT evidence of acute intracranial hemorrhage.    < end of copied text >    ct results pending  --------------------------------------------------------------------------------------- 89y f    TRAUMA ACTIVATION LEVEL:  Trauma Transfer upgraded to Code trauma due to fluid seen on sono    MECHANISM OF INJURY:      [] Blunt  	[] MVC	[x] Fall	[] Pedestrian Struck	[] Motorcycle   [] Assault   [] Bicycle collision  [] Sports injury     [] Penetrating  	[] Gun Shot Wound 		[] Stab Wound    GCS: 	E: 4	V: 5	M: 6      HPI: 89y old f s/p Fall in bathroom, +ht, -loc, -ac. The patients daughter heard the fall and called the ambulance. The patient was taken to ED south where free fluid was seen on FAST, transferred north for trauma evaluation, upgraded at south based on positive fast. The patient reports no pain and has no external signs of trauma. CT scan review showed a history of right hepatic lobe densities suspicious for malignancy.      PAST MEDICAL & SURGICAL HISTORY:  Vertigo  High cholesterol  HTN (hypertension)  History of partial colectomy      Allergies    No Known Allergies    Intolerances        Home Medications:  acetaminophen 325 mg oral tablet: 2 tab(s) orally every 6 hours, As needed, Temp greater or equal to 38C (100.4F), Moderate Pain (4 - 6) (2019 13:45)  aspirin 81 mg oral tablet, chewable: 1 tab(s) orally once a day (2019 15:57)  cefpodoxime 100 mg oral tablet: 1 tab(s) orally every 12 hours for 3 days (2019 13:45)  Centrum Silver oral tablet: 1 tab(s) orally once a day (2019 15:57)  losartan 50 mg oral tablet: 1 tab(s) orally once a day (2019 13:45)  meclizine 12.5 mg oral tablet: 2 tab(s) orally once a day (2019 13:45)  meclizine 25 mg oral tablet: 1 tab(s) orally every 12 hours (2019 13:45)  melatonin 3 mg oral tablet: 1 tab(s) orally once a day (at bedtime), As needed, Insomnia (2019 13:45)  omeprazole 20 mg oral delayed release capsule: orally 2 times a day (2019 15:57)  oxycodone-acetaminophen 5 mg-325 mg oral tablet: 2 tab(s) orally every 6 hours, As needed, Severe Pain (7 - 10) (2019 13:45)  simvastatin 40 mg oral tablet: 1 tab(s) orally once a day (at bedtime) (2019 15:57)      ROS: 10-system review is otherwise negative except HPI above.      Primary Survey:    A - airway intact  B - bilateral breath sounds and good chest rise  C - palpable pulses in all extremities  D - GCS 15 on arrival, PEREIRA  Exposure obtained    Vital Signs Last 24 Hrs  T(C): 36.9 (25 May 2020 08:14), Max: 37.1 (25 May 2020 06:12)  T(F): 98.4 (25 May 2020 08:14), Max: 98.7 (25 May 2020 06:12)  HR: 90 (25 May 2020 08:14) (90 - 96)  BP: 133/74 (25 May 2020 08:14) (114/58 - 164/76)  BP(mean): --  RR: 18 (25 May 2020 08:14) (17 - 18)  SpO2: 98% (25 May 2020 08:14) (94% - 98%)    Secondary Survey:   General: NAD  HEENT: Normocephalic, atraumatic, EOMI, PEERLA. no scalp lacerations   Neck: Soft, midline trachea. no cspine tenderness  Chest: No chest wall tenderness. or subq  emphysema   Cardiac: S1, S2, RRR  Respiratory: Bilateral breath sounds, clear and equal bilaterally  Abdomen: +large ventral hernia. Soft, non-distended, non-tender, no rebound,   Groin: Normal appearing, pelvis stable   Ext: palp radial b/l UE, b/l DP palp in Lower Extrem.   Back: no TTP, no palpable runoff/stepoff/deformity  Rectal: No sara blood, KAMERON with good tone    FAST : free fluid above the liver    Procedures:    LABS:  Labs:  CAPILLARY BLOOD GLUCOSE      POCT Blood Glucose.: 117 mg/dL (25 May 2020 08:19)                          11.4   10.71 )-----------( 394      ( 25 May 2020 06:21 )             35.7       Auto Neutrophil %: 84.0 % (20 @ 06:21)  Auto Immature Granulocyte %: 0.5 % (20 @ 06:21)        132<L>  |  94<L>  |  10  ----------------------------<  134<H>  4.2   |  28  |  0.9      Calcium, Total Serum: 9.1 mg/dL (20 @ 06:21)      LFTs:             6.5  | 0.8  | 42       ------------------[155     ( 25 May 2020 06:21 )  3.1  | x    | 19          Lipase:17     Amylase:x         Lactate, Blood: 1.0 mmol/L (20 @ 06:21)      Coags:     13.50  ----< 1.17    ( 25 May 2020 06:21 )     28.5              Alcohol, Blood: <10 mg/dL (20 @ 06:21)    Urinalysis Basic - ( 25 May 2020 06:21 )    Color: Red / Appearance: Cloudy / S.020 / pH: x  Gluc: x / Ketone: Negative  / Bili: Negative / Urobili: 0.2   Blood: x / Protein: >=300 / Nitrite: Negative   Leuk Esterase: Small / RBC: >50 /HPF / WBC x   Sq Epi: x / Non Sq Epi: x / Bacteria: Few            Alcohol, Blood: <10 mg/dL (20 @ 06:21)      RADIOLOGY & ADDITIONAL STUDIES:  < from: CT Head No Cont (20 @ 08:47) >  Impression:     No CT evidence of acute intracranial hemorrhage.    < end of copied text >    ct results pending  --------------------------------------------------------------------------------------- 89y f    TRAUMA ACTIVATION LEVEL:  Trauma Transfer upgraded to Code trauma due to fluid seen on sono    MECHANISM OF INJURY:      [] Blunt  	[] MVC	[x] Fall	[] Pedestrian Struck	[] Motorcycle   [] Assault   [] Bicycle collision  [] Sports injury     [] Penetrating  	[] Gun Shot Wound 		[] Stab Wound    GCS: 	E: 4	V: 5	M: 6      HPI: 89y old f s/p Fall in bathroom, +ht, -loc, -ac. The patients daughter heard the fall and called the ambulance. The patient was taken to ED south where free fluid was seen on FAST, transferred north for trauma evaluation, upgraded at south based on positive fast. The patient reports no pain and has no external signs of trauma. CT scan review showed a history of right hepatic lobe densities suspicious for malignancy.      PAST MEDICAL & SURGICAL HISTORY:  Vertigo  High cholesterol  HTN (hypertension)  History of partial colectomy      Allergies    No Known Allergies    Intolerances        Home Medications:  acetaminophen 325 mg oral tablet: 2 tab(s) orally every 6 hours, As needed, Temp greater or equal to 38C (100.4F), Moderate Pain (4 - 6) (2019 13:45)  aspirin 81 mg oral tablet, chewable: 1 tab(s) orally once a day (2019 15:57)  cefpodoxime 100 mg oral tablet: 1 tab(s) orally every 12 hours for 3 days (2019 13:45)  Centrum Silver oral tablet: 1 tab(s) orally once a day (2019 15:57)  losartan 50 mg oral tablet: 1 tab(s) orally once a day (2019 13:45)  meclizine 12.5 mg oral tablet: 2 tab(s) orally once a day (2019 13:45)  meclizine 25 mg oral tablet: 1 tab(s) orally every 12 hours (2019 13:45)  melatonin 3 mg oral tablet: 1 tab(s) orally once a day (at bedtime), As needed, Insomnia (2019 13:45)  omeprazole 20 mg oral delayed release capsule: orally 2 times a day (2019 15:57)  oxycodone-acetaminophen 5 mg-325 mg oral tablet: 2 tab(s) orally every 6 hours, As needed, Severe Pain (7 - 10) (2019 13:45)  simvastatin 40 mg oral tablet: 1 tab(s) orally once a day (at bedtime) (2019 15:57)      ROS: 10-system review is otherwise negative except HPI above.      Primary Survey:    A - airway intact  B - bilateral breath sounds and good chest rise  C - palpable pulses in all extremities  D - GCS 15 on arrival, PEREIRA  Exposure obtained    Vital Signs Last 24 Hrs  T(C): 36.9 (25 May 2020 08:14), Max: 37.1 (25 May 2020 06:12)  T(F): 98.4 (25 May 2020 08:14), Max: 98.7 (25 May 2020 06:12)  HR: 90 (25 May 2020 08:14) (90 - 96)  BP: 133/74 (25 May 2020 08:14) (114/58 - 164/76)  BP(mean): --  RR: 18 (25 May 2020 08:14) (17 - 18)  SpO2: 98% (25 May 2020 08:14) (94% - 98%)    Secondary Survey:   General: NAD  HEENT: Normocephalic, atraumatic, EOMI, PEERLA. no scalp lacerations   Neck: Soft, midline trachea. no cspine tenderness  Chest: No chest wall tenderness. or subq  emphysema   Cardiac: S1, S2, RRR  Respiratory: Bilateral breath sounds, clear and equal bilaterally  Abdomen: +large ventral hernia. Soft, non-distended, non-tender, no rebound,   Groin: Normal appearing, pelvis stable   Ext: palp radial b/l UE, b/l DP palp in Lower Extrem.   Back: no TTP, no palpable runoff/stepoff/deformity  Rectal: No sara blood, KAMERON with good tone    FAST : free fluid above the liver    Procedures:    LABS:  Labs:  CAPILLARY BLOOD GLUCOSE      POCT Blood Glucose.: 117 mg/dL (25 May 2020 08:19)                          11.4   10.71 )-----------( 394      ( 25 May 2020 06:21 )             35.7       Auto Neutrophil %: 84.0 % (20 @ 06:21)  Auto Immature Granulocyte %: 0.5 % (20 @ 06:21)        132<L>  |  94<L>  |  10  ----------------------------<  134<H>  4.2   |  28  |  0.9      Calcium, Total Serum: 9.1 mg/dL (20 @ 06:21)      LFTs:             6.5  | 0.8  | 42       ------------------[155     ( 25 May 2020 06:21 )  3.1  | x    | 19          Lipase:17     Amylase:x         Lactate, Blood: 1.0 mmol/L (20 @ 06:21)      Coags:     13.50  ----< 1.17    ( 25 May 2020 06:21 )     28.5              Alcohol, Blood: <10 mg/dL (20 @ 06:21)    Urinalysis Basic - ( 25 May 2020 06:21 )    Color: Red / Appearance: Cloudy / S.020 / pH: x  Gluc: x / Ketone: Negative  / Bili: Negative / Urobili: 0.2   Blood: x / Protein: >=300 / Nitrite: Negative   Leuk Esterase: Small / RBC: >50 /HPF / WBC x   Sq Epi: x / Non Sq Epi: x / Bacteria: Few            Alcohol, Blood: <10 mg/dL (20 @ 06:21)      RADIOLOGY & ADDITIONAL STUDIES:  < from: CT Head No Cont (20 @ 08:47) >  Impression:     No CT evidence of acute intracranial hemorrhage.    < end of copied text >    < from: CT Cervical Spine No Cont (20 @ 08:56) >    IMPRESSION: Degenerative changeinvolving the cervical spine as described above.      < end of copied text >  < from: CT Abdomen and Pelvis w/ IV Cont (20 @ 09:04) >  IMPRESSION:     Minimally displaced acute left posterolateral 10th and 11th rib fractures.    Multiple incisional ventral abdominal wall hernias containing fluid, mesenteric fat and small bowel loops. The small bowel loops appear nonobstructed; However, examination is   limited secondary to lack of oral contrast. Correlation for signs and symptoms of incarceration is suggested.    New moderate volume ascites.    Right moderate effusion, partially loculated adjacent atelectasis.       Dr. Jose Weber discussed preliminary findings with JOSUE GARCIA on 2020 9:20 AM with readback.          Spoke with BARBIE CAREY MD on 2020 10:30 AM with readback.    < end of copied text >    ---------------------------------------------------------------------------------------

## 2020-05-25 NOTE — CONSULT NOTE ADULT - ASSESSMENT
IMPRESSION: Rehab of 89 yo female with presumed moderate dementia, s/p fall with rib fractures, head trauma with no LOC and no obvious cognitive change. She is a poor historian, but says she gets dizzy at times when she gets up and ambulates. She also has untreated breast Ca and ascites. No aggressive therapeutic intervention as per family. She will likely require significant help/ supervision if she is going to return home. PT to evaluate.    PRECAUTIONS: [x  ] Cardiac  [  ] Respiratory  [  ] Seizures [  ] Contact Isolation  [  ] Droplet Isolation  [  ] Other    Weight Bearing Status: wbat    RECOMMENDATION:    Out of Bed to Chair     DVT/Decubiti Prophylaxis    REHAB PLAN:     [ x  ] Bedside P/T 3-5 times a week   [   ]   Bedside O/T  2-3 times a week             [   ] No Rehab Therapy Indicated                   [   ]  Speech Therapy   Conditioning/ROM                                    ADL  Bed Mobility                                               Conditioning/ROM  Transfers                                                     Bed Mobility  Sitting /Standing Balance                         Transfers                                        Gait Training                                               Sitting/Standing Balance  Stair Training [   ]Applicable                    Home equipment Eval                                                                        Splinting  [   ] Only      GOALS:   ADL   [   ]   Independent                    Transfers  [   ] Independent                          Ambulation  [   ] Independent     [    ] With device                            [ x  ]  CG   / supervision                         [x   ]  CG                                                                  [ x  ] CG/ supervision                            [    ] Min A                                                   [   ] Min A                                                              [   ] Min  A          DISCHARGE PLAN:   [   ]  Good candidate for Intensive Rehabilitation/Hospital based-4A [facility]                                             Will tolerate 3hrs Intensive Rehab Daily                                       [    ]  Short Term Rehab in Skilled Nursing Facility                                       [   x ]  Home with  or VN services and supervision/ [facility] vs SNF                                         [    ]  Possible Candidate for Intensive Hospital based Rehab

## 2020-05-25 NOTE — H&P ADULT - ASSESSMENT
# mechanical fall with Minimally displaced acute left posterolateral 10th and 11th rib fractures.  - complete trauma work up, Pelvic x-ray   - PT/ rehab   - pain management   - trauma following     # Incidentally found Right moderate effusion, partially loculated adjacent atelectasis and New moderate volume ascites.  suspected malignant process?  - asymptomatic   - no signs of infectious process   - 2d echo   - NL proteins  - consider thoro and para # mechanical fall with Minimally displaced acute left posterolateral 10th and 11th rib fractures.  - complete trauma work up, Pelvic x-ray   - PT/ rehab   - pain management   - unlikely rabdo, get CK level   - trauma following     # Incidentally found Right moderate effusion, partially loculated adjacent atelectasis and New moderate volume ascites.  suspected malignant process?  - asymptomatic   - no signs of infectious process   - 2d echo   - NL proteins  - consider thoro and para 89 year old female with a PMH of HTN, hypercholesterolemia, remote history of colon cancer, h/o metastatic breast cancer not on any treatment following with dr Garcia and vertigo BIBEMS  after fall at home.     # mechanical fall with Minimally displaced acute left posterolateral 10th and 11th rib fractures.  - complete trauma work up, Pelvic x-ray   - PT/ rehab   - pain management   - unlikely rabdo, get CK level   - trauma following   - orthostatic vital signs     #Right moderate effusion, partially loculated adjacent atelectasis and New moderate volume ascites.  likely related to h/o breast cancer   - asymptomatic   - no signs of infectious process   - per daughter patient doesnt want to  receive any invasive treatment, but they would like to discuss the available modalities with Dr Garcia     # HLD c/w statin  # remote h/o colon cancer: not active     # HTN   on valsartan/ HCTZ hold for now given dizziness and reassess in AM     # Vertigo c/w meclizine     # DASH Diet   # DVT PPX   # DNR/DNI  # Dispo per PT rehab, daughter is requesting 24/7 aid

## 2020-05-25 NOTE — ED PROVIDER NOTE - NS ED ROS FT
CONST: No fever, chills or bodyaches  EYES: No pain, redness, drainage or visual changes.  ENT: No ear pain or discharge, nasal discharge or congestion. No sore throat  CARD: No chest pain, palpitations  RESP: (+) left rib pain. No SOB, cough, hemoptysis. No hx of asthma or COPD  GI: No abdominal pain, N/V/D  MS: (+) left side back pain. No joint pain, extremity pain/injury.   SKIN: No rashes  NEURO: No headache, dizziness, paresthesias or LOC

## 2020-05-25 NOTE — CONSULT NOTE ADULT - ATTENDING COMMENTS
Patient seen and examined with surgery trauma team after arrival and discussed management plans. Known h/o malignancy with liver lesion noted in 2019. Patient awake and alert denies any pain known abd incisional hernias. Initial Ct reviewed R pleural effusion and ascites, no gross solid organ injury noted.  Await official reports. Discussed with daughter DNR DNI patient does not want any surgical intervention.

## 2020-05-25 NOTE — CONSULT NOTE ADULT - ASSESSMENT
ASSESSMENT:  89y old f s/p fall in bathroom, +ht, -loc, -ac    PLAN:    - pan scan, cxr, pelvic xray ( done at [facility])  - routine trauma labs  - If negative, cleared from trauma  -  d/w Dr. Clemens ASSESSMENT:  89y old f s/p fall in bathroom, +ht, -loc, -ac, patient found to have acute left posterolateral 10th and 11th rib fractures, reports no pain and has no tenderness on examination, the patient has continued dizziness and frequent falls, will be admitted for placement and PT/Rehab. Cleared from trauma.     PLAN:    - pan scan, cxr, pelvic xray ( done at [facility])  - routine trauma labs  - If negative, cleared from trauma  -  d/w Dr. Clemens ASSESSMENT:  89y old f s/p fall in bathroom, +ht, -loc, -ac, patient found to have acute left posterolateral 10th and 11th rib fractures, reports no pain and has no tenderness on examination, the patient has continued dizziness and frequent falls, will be admitted for placement and PT/Rehab.     PLAN:    - pan scan, cxr, pelvic xray ( done at [facility])  - routine trauma labs  - If negative, cleared from trauma  -  d/w Dr. Clemens    Senior Note  I have personally examined and evaluated the patient  I agree with the above plan and note, and I have edited where appropriate  Additionally:  -2 rib fx as above  -Pt still c/o dizziness and unable to ambulate in ED  -Admit to medicine  -C/s PT/Rehab  -D/w daughter proxy, Radha Oalkey, [phone] (H),  [phone] (C)  Surgical Attending aware and agrees with plan

## 2020-05-25 NOTE — ED PROVIDER NOTE - ATTENDING CONTRIBUTION TO CARE
I personally evaluated the patient. I reviewed the Resident’s or Physician Assistant’s note (as assigned above), and agree with the findings and plan except as documented in my note.  Chart reviewed. 89 y/o WF H/O HTN, HLD, vertigo, fell off her bed and hurt left lower back. No head trauma. Exam shows alert patient in no distress, HEENT NCAT, neck non-tender, lungs clear, +left rib tenderness, abdomen soft NT +BS, +reducible ventral hernia, +tenderness left flank, no CCE.  Fast exam +free fluid. Will transfer North for trauma evaluation.

## 2020-05-25 NOTE — ED PROVIDER NOTE - OBJECTIVE STATEMENT
89 y/o female with a PMH of HTN, hypercholesterolemia, and vertigo presents to the ED BIBEMS  s/p rolling off her bed onto the wood floor about an hour prior to arrival. pt admits to left side rib pain and back pain. Pt admits she lives in a two family house on the first floor with her daughter. Pt denies LOC, hitting her head, n/v/d, chest pain, sob, abdominal pain, neck pain, dizziness, headaches, numbness, or tingling. 89 y/o female with a PMH of HTN, hypercholesterolemia, and vertigo presents to the ED BIBEMS  s/p fall at home. patient reported she walked to the bathroom and fell. EMS also reported patient fell at bathroom. pt admits to left side rib pain and back pain. Pt admits she lives in a two family house on the first floor with her daughter. Pt denies LOC, hitting her head, n/v/d, chest pain, sob, abdominal pain, neck pain, dizziness, headaches, numbness, or tingling.  Patient lives by self and usually uses a cane to walk.

## 2020-05-25 NOTE — ED PROVIDER NOTE - PHYSICAL EXAMINATION
Physical Exam    Vital Signs: I have reviewed the initial vital signs.  Constitutional: well-nourished, appears stated age, no acute distress  Eyes: Conjunctiva pink, Sclera clear, PERRLA, EOMI.  Cardiovascular: regular rate, regular rhythm, well-perfused extremities, radial pulses equal and 2+ b/l.   Respiratory: unlabored respiratory effort, clear to auscultation bilaterally no wheezing, rales and rhonchi. pt is speaking full sentences. no accessory muscle use.   Gastrointestinal: soft, non-tender , non-distended abdomen, no pulsatile mass, no rebound. no guarding. (+  Musculoskeletal: supple neck, no lower extremity edema, no midline tenderness  Integumentary: warm, dry, no rash  Neurologic: awake, alert, cranial nerves II-XII grossly intact, extremities’ motor and sensory functions grossly intact  Psychiatric: appropriate mood, appropriate affect Physical Exam    Vital Signs: I have reviewed the initial vital signs.  Constitutional: well-nourished, appears stated age, no acute distress  Eyes: Conjunctiva pink, Sclera clear, PERRLA, EOMI.  Cardiovascular: regular rate, regular rhythm, well-perfused extremities, radial pulses equal and 2+ b/l.   Respiratory: unlabored respiratory effort, clear to auscultation bilaterally no wheezing, rales and rhonchi. pt is speaking full sentences. no accessory muscle use. left side rib tenderness to palpation. no crepitus on chest wall.   Gastrointestinal: soft, non-tender , non-distended abdomen, no pulsatile mass, no rebound. no guarding. (+) reducible nontender ventral hernia.   Musculoskeletal:  supple neck, no lower extremity edema, no midline tenderness. (+) left thoracic paraspinal tenderness.   Integumentary: warm, dry, no rash. no abrasions. no lacerations. no ecchymosis.   Neurologic: skull. atraumatic normocephalic. a&o to person and palce. cranial nerves II-XII grossly intact, extremities’ motor and sensory functions grossly intact. finger to nose intact.

## 2020-05-25 NOTE — H&P ADULT - ATTENDING COMMENTS
Patient seen and examined independently. Agree with resident note/ history / physical exam and plan of care with following exceptions/additions/updates. Case discussed with patient/pt decision maker, house-staff and nursing.   pt is awake and alert but disoriented to time. oriented to person and place. pain is controlled, main complaint is dizziness ( vertigo) and it is managed with meclizine.   called daughter but was not able to speak with her, I left a msg for her to call me back. according to the chart, family wants HHA and wants to speak with Dr Hampton before making decisions regarding her GOC. Dr Hampton is aware, he will contact the daughter.   pt would be a candidate for conservative management, poss home hospice vs palliative care. will have to wait for their discussion with Dr Hampton so they can make decision.   DNR, DNI

## 2020-05-25 NOTE — ED ADULT NURSE REASSESSMENT NOTE - NS ED NURSE REASSESS COMMENT FT1
Pt transferred from [facility], Code trauma initiated, VSS, NSR on monitor, pt a&o x3, pt states she felt dizzy and fell in bathroom at home. Pt lives alone-daughter lives downstairs. Pt has abdominal hernia. Pain 5/5
report called to [facility] 1838, RN MARGARET. pt vs stable at this time. pt a&Ox3. IV intact. awaiting transport.
pt left [facility] ER at 0750 with primary care ambulance

## 2020-05-25 NOTE — H&P ADULT - NSHPLABSRESULTS_GEN_ALL_CORE
11.4   10.71 )-----------( 394      ( 25 May 2020 06:21 )             35.7           132<L>  |  94<L>  |  10  ----------------------------<  134<H>  4.2   |  28  |  0.9    Ca    9.1      25 May 2020 06:21    TPro  6.5  /  Alb  3.1<L>  /  TBili  0.8  /  DBili  x   /  AST  42<H>  /  ALT  19  /  AlkPhos  155<H>                Urinalysis Basic - ( 25 May 2020 06:21 )    Color: Red / Appearance: Cloudy / S.020 / pH: x  Gluc: x / Ketone: Negative  / Bili: Negative / Urobili: 0.2   Blood: x / Protein: >=300 / Nitrite: Negative   Leuk Esterase: Small / RBC: >50 /HPF / WBC x   Sq Epi: x / Non Sq Epi: x / Bacteria: Few        PT/INR - ( 25 May 2020 06:21 )   PT: 13.50 sec;   INR: 1.17 ratio         PTT - ( 25 May 2020 06:21 )  PTT:28.5 sec    Lactate Trend   @ 06:21 Lactate:1.0             CAPILLARY BLOOD GLUCOSE      POCT Blood Glucose.: 117 mg/dL (25 May 2020 08:19)        < from: CT Chest w/ IV Cont (20 @ 09:04) >    Minimally displaced acute left posterolateral 10th and 11th rib fractures.    Multiple incisional ventral abdominal wall hernias containing fluid, mesenteric fat and small bowel loops. The small bowel loops appear nonobstructed; However, examination is   limited secondary to lack of oral contrast. Correlation for signs and symptoms of incarceration is suggested.    New moderate volume ascites.    Right moderate effusion, partially loculated adjacent atelectasis.     < end of copied text >    < from: CT Cervical Spine No Cont (20 @ 08:56) >    IMPRESSION: Degenerative changeinvolving the cervical spine as described above.    < end of copied text >    < from: CT Head No Cont (20 @ 08:47) >    Impression:     No CT evidence of acute intracranial hemorrhage.    < end of copied text >

## 2020-05-25 NOTE — ED PROVIDER NOTE - PROGRESS NOTE DETAILS
positive fast, free fluid around the liver, and spleen. bladder appears collapsed surrounding fluid. pt urinating gross red blood. Discussed case with Dr. Arora. Transfer [place] for trauma evaluation. GW: I personally evaluated the patient. I reviewed the Physician Assistant Fellow's note and agree with the findings and plan. Haile: NAD.  abd is mildly distended.  repeat VS noted.  await transport to trauma center. pt arrived to [place]. stable. trauma notified. pt arrived to [place]. stable vitals. trauma notified; trauma upgraded alert to code for fluid finding in abdomen CO- pt received from [facility], VSS, FAST Positive, code trauma called. surg at bedside. surg requesting panscan given stable VS. CO- call from Rad saying dilated bowel, new ascites c/f strangulation, L 10/11 rib fx. case d/w surgery who will monitor/get final read. pt endorsed to Dr. Monte- pending final ct, reassessment, surgical recs, continued d/w family PT SIGNED OUT TO ME BY DR. GARCIA, FOLLOW UP CT SCANS, TRAUMA/SURGERY CONSULT, REASSESS AND DISPO. NG tube attempted Patient produced hematuria, cola colored urine again. Attempted to walk patient, not able to walk, complaining of dizziness. Spoke to surgery recommend PT, Rehab and Medicine admission, and Urology/nephrology consult as inpatient not thinking hematuria is traumatic, as no gross red blood...surgery has spoken with daughter and is aware of patient being admitted. Signed out to Dr. Montero Medicine, aware of hematuria and patient's inability to ambulate due to dizziness.

## 2020-05-25 NOTE — ED PROVIDER NOTE - CLINICAL SUMMARY MEDICAL DECISION MAKING FREE TEXT BOX
89 Y/O F S/P FALL AT HOME. + 2 L RIB FXS. TRAUMA CONSULTED. + FF ON FAST. NO SOLID ORGAN INJURY ON CT SCAN. ALL DIAGNOSTIC TESTING REVIEWED. ELEVATED CK. IVFS STARTED. PT ADMITTED TO MEDICINE.

## 2020-05-25 NOTE — CONSULT NOTE ADULT - SUBJECTIVE AND OBJECTIVE BOX
HPI:  89 year old female with a PMH of HTN, hypercholesterolemia, remote history of colon cancer, h/o metastatic breast cancer not on any treatment following with dr Garcia and vertigo BIBEMS  after fall at home.   patient tripped while she was walking to the bathroom, she pressed her alert button, she was able to stand up and walk to her bed after she fell, fall unwitnessed, now she complaints of left side rib pain and back pain.  denies LOC, or head trauma.  denies any nausea or other preceding symptoms. per daughter she has been complaining of dizziness in the last few month. never lost consciousness.   denies chest pain, sob, abdominal pain, neck pain, dizziness, headaches, numbness, or tingling.  lives in a two family house on the first floor with her daughter.  Patient lives by self and usually uses a cane to walk. She is a poor historian.    In ED Vital Signs   T(F): 98.4  HR: 99   BP: 128/71  RR: 18   SpO2: 98%   trauma work up was negative except of Minimally displaced acute left posterolateral 10th and 11th rib fractures, and  Incidentally found Right moderate effusion, partially loculated adjacent atelectasis and New moderate volume ascites. (25 May 2020 13:06)      PAST MEDICAL & SURGICAL HISTORY:  Vertigo  High cholesterol  HTN (hypertension)  History of partial colectomy      Hospital Course: admitted. stable    TODAY'S SUBJECTIVE & REVIEW OF SYMPTOMS:     Constitutional WNL   Cardio WNL   Resp WNL   GI WNL  Heme WNL  Endo WNL  Skin WNL  MSK WNL  Neuro WNL  Cognitive WNL  Psych WNL      MEDICATIONS  (STANDING):  chlorhexidine 4% Liquid 1 Application(s) Topical <User Schedule>  enoxaparin Injectable 40 milliGRAM(s) SubCutaneous daily  meclizine 25 milliGRAM(s) Oral every 8 hours  pantoprazole    Tablet 40 milliGRAM(s) Oral before breakfast  simvastatin 40 milliGRAM(s) Oral at bedtime    MEDICATIONS  (PRN):      FAMILY HISTORY:  No pertinent family history in first degree relatives      Allergies    No Known Allergies    Intolerances        SOCIAL HISTORY:    [  ] Etoh  [  ] Smoking  [  ] Substance abuse     Home Environment:  [x  ] Home Alone- downstairs from family in 2 family house  [  ] Lives with Family  [  ] Home Health Aid    Dwelling:  [  ] Apartment  [x  ] Private House  [  ] Adult Home  [  ] Skilled Nursing Facility      [  ] Short Term  [  ] Long Term  [  ] Stairs       Elevator [  ]    FUNCTIONAL STATUS PTA: (Check all that apply)  Ambulation: [x   ]Independent    [  ] Dependent     [  ] Non-Ambulatory  Assistive Device: [ x ] SA Cane  [  ]  Q Cane  [  ] Walker  [  ]  Wheelchair  ADL : [x  ] Independent  [  ]  Dependent       Vital Signs Last 24 Hrs  T(C): 36.9 (25 May 2020 08:14), Max: 37.1 (25 May 2020 06:12)  T(F): 98.4 (25 May 2020 08:14), Max: 98.7 (25 May 2020 06:12)  HR: 99 (25 May 2020 10:41) (90 - 99)  BP: 128/71 (25 May 2020 10:41) (114/58 - 164/76)  BP(mean): 84 (25 May 2020 10:41) (84 - 84)  RR: 18 (25 May 2020 10:41) (17 - 18)  SpO2: 98% (25 May 2020 10:41) (94% - 98%)      PHYSICAL EXAM: Alert & Oriented to place and year, not month- states age as 89- some confusion. Follows commands   GENERAL: NAD, well-groomed, well-developed  HEAD:  Atraumatic, Normocephalic  EYES: EOMI, PER  NECK: Supple, No JVD  CHEST/LUNG: Clear  HEART: Regular rate and rhythm  ABDOMEN: Soft, Nontender, mildly distended firm  EXTREMITIES: No clubbing, cyanosis, or edema    NERVOUS SYSTEM:  Cranial Nerves 2-12 intact [x  ] Abnormal  [  ]  ROM: WFL all extremities [x  ]  Abnormal [  ]  Motor Strength: WFL all extremities  [ x ]  Abnormal [  ]  Sensation: intact to light touch [ x ] Abnormal [  ]  Reflexes: Symmetric [  ]  Abnormal [  ]    FUNCTIONAL STATUS: TBA- in stretcher in hallway  Bed Mobility: Independent [  ]  Supervision [  ]  Needs Assistance [  ]  N/A [  ]  Transfers: Independent [  ]  Supervision [  ]  Needs Assistance [  ]  N/A [  ]   Ambulation: Independent [  ]  Supervision [  ]  Needs Assistance [  ]  N/A [  ]  ADL: Independent [  ] Requires Assistance [  ] N/A [  ]      LABS:                        11.4   10.71 )-----------( 394      ( 25 May 2020 06:21 )             35.7     05-25    132<L>  |  94<L>  |  10  ----------------------------<  134<H>  4.2   |  28  |  0.9    Ca    9.1      25 May 2020 06:21    TPro  6.5  /  Alb  3.1<L>  /  TBili  0.8  /  DBili  x   /  AST  42<H>  /  ALT  19  /  AlkPhos  155<H>  05-25    PT/INR - ( 25 May 2020 06:21 )   PT: 13.50 sec;   INR: 1.17 ratio         PTT - ( 25 May 2020 06:21 )  PTT:28.5 sec  Urinalysis Basic - ( 25 May 2020 06:21 )    Color: Red / Appearance: Cloudy / S.020 / pH: x  Gluc: x / Ketone: Negative  / Bili: Negative / Urobili: 0.2   Blood: x / Protein: >=300 / Nitrite: Negative   Leuk Esterase: Small / RBC: >50 /HPF / WBC x   Sq Epi: x / Non Sq Epi: x / Bacteria: Few        RADIOLOGY & ADDITIONAL STUDIES:    Assesment:

## 2020-05-25 NOTE — H&P ADULT - HISTORY OF PRESENT ILLNESS
89 year old female with a PMH of HTN, hypercholesterolemia, and vertigo BIBEMS  after fall at home.     patient tripped while she was walking to the bathroom,     complaints of left side rib pain and back pain.  denies LOC, or head trauma.  denies any nausea or other preceding symptoms.  denies chest pain, sob, abdominal pain, neck pain, dizziness, headaches, numbness, or tingling.  lives in a two family house on the first floor with her daughter.  Patient lives by self and usually uses a cane to walk.    In ED Vital Signs   T(F): 98.4  HR: 99   BP: 128/71  RR: 18   SpO2: 98%   trauma work up was negative except of Minimally displaced acute left posterolateral 10th and 11th rib fractures, and  Incidentally found Right moderate effusion, partially loculated adjacent atelectasis and New moderate volume ascites. 89 year old female with a PMH of HTN, hypercholesterolemia, remote history of colon cancer, h/o metastatic breast cancer not on any treatment following with dr Garcia and vertigo BIBEMS  after fall at home.   patient tripped while she was walking to the bathroom, she pressed her alert button, she was able to stand up and walk to her bed after she fell, fall unwitnessed, now she complaints of left side rib pain and back pain.  denies LOC, or head trauma.  denies any nausea or other preceding symptoms. per daughter she has been complaining of dizziness in the last few month. never lost consciousness.   denies chest pain, sob, abdominal pain, neck pain, dizziness, headaches, numbness, or tingling.  lives in a two family house on the first floor with her daughter.  Patient lives by self and usually uses a cane to walk.    In ED Vital Signs   T(F): 98.4  HR: 99   BP: 128/71  RR: 18   SpO2: 98%   trauma work up was negative except of Minimally displaced acute left posterolateral 10th and 11th rib fractures, and  Incidentally found Right moderate effusion, partially loculated adjacent atelectasis and New moderate volume ascites.

## 2020-05-26 LAB
ALBUMIN SERPL ELPH-MCNC: 2.9 G/DL — LOW (ref 3.5–5.2)
ALP SERPL-CCNC: 136 U/L — HIGH (ref 30–115)
ALT FLD-CCNC: 17 U/L — SIGNIFICANT CHANGE UP (ref 0–41)
ANION GAP SERPL CALC-SCNC: 11 MMOL/L — SIGNIFICANT CHANGE UP (ref 7–14)
AST SERPL-CCNC: 33 U/L — SIGNIFICANT CHANGE UP (ref 0–41)
BASOPHILS # BLD AUTO: 0.03 K/UL — SIGNIFICANT CHANGE UP (ref 0–0.2)
BASOPHILS NFR BLD AUTO: 0.4 % — SIGNIFICANT CHANGE UP (ref 0–1)
BILIRUB SERPL-MCNC: 0.9 MG/DL — SIGNIFICANT CHANGE UP (ref 0.2–1.2)
BUN SERPL-MCNC: 11 MG/DL — SIGNIFICANT CHANGE UP (ref 10–20)
CALCIUM SERPL-MCNC: 9 MG/DL — SIGNIFICANT CHANGE UP (ref 8.5–10.1)
CHLORIDE SERPL-SCNC: 96 MMOL/L — LOW (ref 98–110)
CO2 SERPL-SCNC: 28 MMOL/L — SIGNIFICANT CHANGE UP (ref 17–32)
CREAT SERPL-MCNC: 0.7 MG/DL — SIGNIFICANT CHANGE UP (ref 0.7–1.5)
EOSINOPHIL # BLD AUTO: 0.06 K/UL — SIGNIFICANT CHANGE UP (ref 0–0.7)
EOSINOPHIL NFR BLD AUTO: 0.7 % — SIGNIFICANT CHANGE UP (ref 0–8)
GLUCOSE SERPL-MCNC: 119 MG/DL — HIGH (ref 70–99)
HCT VFR BLD CALC: 33.5 % — LOW (ref 37–47)
HGB BLD-MCNC: 10.9 G/DL — LOW (ref 12–16)
IMM GRANULOCYTES NFR BLD AUTO: 0.6 % — HIGH (ref 0.1–0.3)
LYMPHOCYTES # BLD AUTO: 0.44 K/UL — LOW (ref 1.2–3.4)
LYMPHOCYTES # BLD AUTO: 5.4 % — LOW (ref 20.5–51.1)
MCHC RBC-ENTMCNC: 29.1 PG — SIGNIFICANT CHANGE UP (ref 27–31)
MCHC RBC-ENTMCNC: 32.5 G/DL — SIGNIFICANT CHANGE UP (ref 32–37)
MCV RBC AUTO: 89.3 FL — SIGNIFICANT CHANGE UP (ref 81–99)
MONOCYTES # BLD AUTO: 0.73 K/UL — HIGH (ref 0.1–0.6)
MONOCYTES NFR BLD AUTO: 8.9 % — SIGNIFICANT CHANGE UP (ref 1.7–9.3)
NEUTROPHILS # BLD AUTO: 6.88 K/UL — HIGH (ref 1.4–6.5)
NEUTROPHILS NFR BLD AUTO: 84 % — HIGH (ref 42.2–75.2)
NRBC # BLD: 0 /100 WBCS — SIGNIFICANT CHANGE UP (ref 0–0)
PLATELET # BLD AUTO: 345 K/UL — SIGNIFICANT CHANGE UP (ref 130–400)
POTASSIUM SERPL-MCNC: 3.7 MMOL/L — SIGNIFICANT CHANGE UP (ref 3.5–5)
POTASSIUM SERPL-SCNC: 3.7 MMOL/L — SIGNIFICANT CHANGE UP (ref 3.5–5)
PROT SERPL-MCNC: 6.1 G/DL — SIGNIFICANT CHANGE UP (ref 6–8)
RBC # BLD: 3.75 M/UL — LOW (ref 4.2–5.4)
RBC # FLD: 14.3 % — SIGNIFICANT CHANGE UP (ref 11.5–14.5)
SODIUM SERPL-SCNC: 135 MMOL/L — SIGNIFICANT CHANGE UP (ref 135–146)
WBC # BLD: 8.19 K/UL — SIGNIFICANT CHANGE UP (ref 4.8–10.8)
WBC # FLD AUTO: 8.19 K/UL — SIGNIFICANT CHANGE UP (ref 4.8–10.8)

## 2020-05-26 PROCEDURE — 71045 X-RAY EXAM CHEST 1 VIEW: CPT | Mod: 26

## 2020-05-26 PROCEDURE — 99223 1ST HOSP IP/OBS HIGH 75: CPT

## 2020-05-26 RX ORDER — MECLIZINE HCL 12.5 MG
25 TABLET ORAL ONCE
Refills: 0 | Status: COMPLETED | OUTPATIENT
Start: 2020-05-26 | End: 2020-05-26

## 2020-05-26 RX ORDER — LIDOCAINE 4 G/100G
1 CREAM TOPICAL DAILY
Refills: 0 | Status: DISCONTINUED | OUTPATIENT
Start: 2020-05-26 | End: 2020-05-29

## 2020-05-26 RX ADMIN — Medication 25 MILLIGRAM(S): at 06:04

## 2020-05-26 RX ADMIN — CHLORHEXIDINE GLUCONATE 1 APPLICATION(S): 213 SOLUTION TOPICAL at 06:04

## 2020-05-26 RX ADMIN — Medication 25 MILLIGRAM(S): at 11:52

## 2020-05-26 RX ADMIN — LIDOCAINE 1 PATCH: 4 CREAM TOPICAL at 19:47

## 2020-05-26 RX ADMIN — Medication 25 MILLIGRAM(S): at 21:10

## 2020-05-26 RX ADMIN — PANTOPRAZOLE SODIUM 40 MILLIGRAM(S): 20 TABLET, DELAYED RELEASE ORAL at 06:04

## 2020-05-26 RX ADMIN — LIDOCAINE 1 PATCH: 4 CREAM TOPICAL at 16:42

## 2020-05-26 RX ADMIN — Medication 25 MILLIGRAM(S): at 13:30

## 2020-05-26 RX ADMIN — SIMVASTATIN 40 MILLIGRAM(S): 20 TABLET, FILM COATED ORAL at 21:10

## 2020-05-26 RX ADMIN — ENOXAPARIN SODIUM 40 MILLIGRAM(S): 100 INJECTION SUBCUTANEOUS at 11:52

## 2020-05-26 NOTE — CHART NOTE - NSCHARTNOTEFT_GEN_A_CORE
Trauma tertiary Survey    Patient seen and examined. Patient laying comfortably in bed. Denies pain or discomfort. No complaints at this time.     PHYSICAL EXAM:  Craniofacial: Atraumatic, No deformity  Eyes: Pupil Size equal B/L and RTL. EOMI  Oropharynx: Atraumatic  Neck: Non-tender, No deformity, Trachea midline.  Chest: Equal breath sounds, non-tender, No deformity or crepitus  Heart: RSR, No murmurs, no rubs  Abdomen: Atraumatic, Non-tender, non-distended. No hepatosplenomegaly  Pelvis: Stable, non-tender, no deformity  Back: Spine non-tender, Atraumatic  Extremities: Atraumatic, no deformities, normal pulses, moving all extremities   Neurologic: CN intact, Motor intact throughout. Sensation intact/normal throughout.  Psych: Mood and affect normal. Judgment and insight normal    All images/reports reviewed. No further traumatic work-up warranted.

## 2020-05-26 NOTE — PHYSICAL THERAPY INITIAL EVALUATION ADULT - PHYSICAL ASSIST/NONPHYSICAL ASSIST: SUPINE/SIT, REHAB EVAL
Shoe recommendations:   asics (GT 1000 or gel foundations), new balance, sasandeep, perry, vionix (tennis shoe)    sofft brand, clarks, michelle, aerosoles, naturalizers, SAS, eccovanessa cole hahn (dress shoes)    Vionix, volitiles, marytocks, (sandals)    Nike comfort thong sandals (house shoes)   verbal cues/1 person assist

## 2020-05-26 NOTE — PHYSICAL THERAPY INITIAL EVALUATION ADULT - GAIT DISTANCE, PT EVAL
Pt ambulated in room, unable to focus 2* to requesting Meclizine. Pt educated on receiving it at 6 am as per RN, but pt still only focusing on meds. Pt opening and closing closets, unsafe at this time, therefore returned to bed./50 feet

## 2020-05-26 NOTE — PHYSICAL THERAPY INITIAL EVALUATION ADULT - ORIENTATION, REHAB EVAL
pt presented very distressed about receiving Meclizine at 10 am, unable to focus on answering any questions/person/unable to assess

## 2020-05-26 NOTE — PHYSICAL THERAPY INITIAL EVALUATION ADULT - GENERAL OBSERVATIONS, REHAB EVAL
10:05-10:38 Pt encountered supine in bed in NaD Agreeable for PT. Pt requesting Meclizine, Rowena RN reports pt received it at 6 am, pt requesting it again despite education.

## 2020-05-26 NOTE — PROGRESS NOTE ADULT - SUBJECTIVE AND OBJECTIVE BOX
VIDA CORTES 89y Female  MRN#: [id number]     SUBJECTIVE  Patient is a 89y old Female who presents with a chief complaint of fall (25 May 2020 15:04)  Currently admitted to medicine with the primary diagnosis of Intraabdominal hemorrhage  Today is hospital day 1d, and this morning she is feeling ok  No issues overnight    OBJECTIVE  PAST MEDICAL & SURGICAL HISTORY  Vertigo  High cholesterol  HTN (hypertension)  History of partial colectomy    ALLERGIES:  No Known Allergies    MEDICATIONS:  STANDING MEDICATIONS  chlorhexidine 4% Liquid 1 Application(s) Topical <User Schedule>  enoxaparin Injectable 40 milliGRAM(s) SubCutaneous daily  meclizine 25 milliGRAM(s) Oral every 8 hours  pantoprazole    Tablet 40 milliGRAM(s) Oral before breakfast  simvastatin 40 milliGRAM(s) Oral at bedtime    PRN MEDICATIONS  ibuprofen  Tablet. 400 milliGRAM(s) Oral every 6 hours PRN      VITAL SIGNS: Last 24 Hours  T(C): 36.1 (26 May 2020 04:29), Max: 36.6 (25 May 2020 20:24)  T(F): 97 (26 May 2020 04:29), Max: 97.8 (25 May 2020 20:24)  HR: 73 (26 May 2020 04:29) (73 - 99)  BP: 135/60 (26 May 2020 04:29) (119/72 - 165/69)  BP(mean): 84 (25 May 2020 10:41) (84 - 84)  RR: 18 (26 May 2020 04:29) (18 - 18)  SpO2: 93% (26 May 2020 07:56) (93% - 98%)    LABS:                        10.9   8.19  )-----------( 345      ( 26 May 2020 06:44 )             33.5     05-26    135  |  96<L>  |  11  ----------------------------<  119<H>  3.7   |  28  |  0.7    Ca    9.0      26 May 2020 06:44    TPro  6.1  /  Alb  2.9<L>  /  TBili  0.9  /  DBili  x   /  AST  33  /  ALT  17  /  AlkPhos  136<H>  05-26    PT/INR - ( 25 May 2020 06:21 )   PT: 13.50 sec;   INR: 1.17 ratio         PTT - ( 25 May 2020 06:21 )  PTT:28.5 sec  Urinalysis Basic - ( 25 May 2020 06:21 )    Color: Red / Appearance: Cloudy / S.020 / pH: x  Gluc: x / Ketone: Negative  / Bili: Negative / Urobili: 0.2   Blood: x / Protein: >=300 / Nitrite: Negative   Leuk Esterase: Small / RBC: >50 /HPF / WBC x   Sq Epi: x / Non Sq Epi: x / Bacteria: Few    Creatine Kinase, Serum: 56 U/L (20 @ 17:24)    CARDIAC MARKERS ( 25 May 2020 17:24 )  x     / x     / 56 U/L / x     / x        RADIOLOGY:    PHYSICAL EXAM:    GENERAL: NAD, well-developed, AAOx3  HEENT:  Atraumatic, Normocephalic. EOMI, PERRLA, conjunctiva and sclera clear, No JVD  PULMONARY: Clear to auscultation bilaterally; No wheeze  CARDIOVASCULAR: Regular rate and rhythm; No murmurs, rubs, or gallops  GASTROINTESTINAL: Soft, Nontender, Nondistended; Bowel sounds present. Abdominal hernia, no appreciated ascites   MUSCULOSKELETAL:  2+ Peripheral Pulses, No clubbing, cyanosis, or edema  NEUROLOGY: non-focal  SKIN: No rashes or lesions    ASSESSMENT & PLAN    89 year old female with a PMH of HTN, hypercholesterolemia, remote history of colon cancer, h/o metastatic breast cancer not on any treatment following with dr Garcia and vertigo BIBEMS  after a mechanical fall at home.     # mechanical fall with Minimally displaced acute left posterolateral 10th and 11th rib fractures.  - complete trauma work up, Pelvic x-ray   - PT/ rehab   - pain management   - unlikely rabdo, get CK level   - trauma following   - orthostatic vital signs     #Right moderate effusion, partially loculated adjacent atelectasis and New moderate volume ascites.  likely related to h/o breast cancer   - asymptomatic   - no signs of infectious process   - per daughter patient doesnt want to  receive any invasive treatment, but they would like to discuss the available modalities with Dr Garcia     # HLD c/w statin  # remote h/o colon cancer: not active     # HTN   on valsartan/ HCTZ hold for now given dizziness and reassess in AM     # Vertigo c/w meclizine     # DASH Diet   # DVT PPX   # DNR/DNI  # Dispo per PT rehab, daughter is requesting 24/ aid   DVT prophylaxis:   Diet: Diet, DASH/TLC:   Sodium & Cholesterol Restricted (20 @ 14:31)  Dispo:   Ambulation: VIDA CORTES 89y Female  MRN#: [id number]     SUBJECTIVE  Patient is a 89y old Female who presents with a chief complaint of fall (25 May 2020 15:04)  Currently admitted to medicine with the primary diagnosis of Intraabdominal hemorrhage  Today is hospital day 1d, and this morning she is feeling ok  No issues overnight    OBJECTIVE  PAST MEDICAL & SURGICAL HISTORY  Vertigo  High cholesterol  HTN (hypertension)  History of partial colectomy    ALLERGIES:  No Known Allergies    MEDICATIONS:  STANDING MEDICATIONS  chlorhexidine 4% Liquid 1 Application(s) Topical <User Schedule>  enoxaparin Injectable 40 milliGRAM(s) SubCutaneous daily  meclizine 25 milliGRAM(s) Oral every 8 hours  pantoprazole    Tablet 40 milliGRAM(s) Oral before breakfast  simvastatin 40 milliGRAM(s) Oral at bedtime    PRN MEDICATIONS  ibuprofen  Tablet. 400 milliGRAM(s) Oral every 6 hours PRN      VITAL SIGNS: Last 24 Hours  T(C): 36.1 (26 May 2020 04:29), Max: 36.6 (25 May 2020 20:24)  T(F): 97 (26 May 2020 04:29), Max: 97.8 (25 May 2020 20:24)  HR: 73 (26 May 2020 04:29) (73 - 99)  BP: 135/60 (26 May 2020 04:29) (119/72 - 165/69)  BP(mean): 84 (25 May 2020 10:41) (84 - 84)  RR: 18 (26 May 2020 04:29) (18 - 18)  SpO2: 93% (26 May 2020 07:56) (93% - 98%)    LABS:                        10.9   8.19  )-----------( 345      ( 26 May 2020 06:44 )             33.5     05-26    135  |  96<L>  |  11  ----------------------------<  119<H>  3.7   |  28  |  0.7    Ca    9.0      26 May 2020 06:44    TPro  6.1  /  Alb  2.9<L>  /  TBili  0.9  /  DBili  x   /  AST  33  /  ALT  17  /  AlkPhos  136<H>  05-26    PT/INR - ( 25 May 2020 06:21 )   PT: 13.50 sec;   INR: 1.17 ratio         PTT - ( 25 May 2020 06:21 )  PTT:28.5 sec  Urinalysis Basic - ( 25 May 2020 06:21 )    Color: Red / Appearance: Cloudy / S.020 / pH: x  Gluc: x / Ketone: Negative  / Bili: Negative / Urobili: 0.2   Blood: x / Protein: >=300 / Nitrite: Negative   Leuk Esterase: Small / RBC: >50 /HPF / WBC x   Sq Epi: x / Non Sq Epi: x / Bacteria: Few    Creatine Kinase, Serum: 56 U/L (20 @ 17:24)    CARDIAC MARKERS ( 25 May 2020 17:24 )  x     / x     / 56 U/L / x     / x        RADIOLOGY:    PHYSICAL EXAM:    GENERAL: NAD, well-developed, AAOx3  HEENT:  Atraumatic, Normocephalic. EOMI, PERRLA, conjunctiva and sclera clear, No JVD  PULMONARY: Clear to auscultation bilaterally; No wheeze  CARDIOVASCULAR: Regular rate and rhythm; No murmurs, rubs, or gallops  GASTROINTESTINAL: Soft, Nontender, Nondistended; Bowel sounds present. Abdominal hernia, no appreciated ascites   MUSCULOSKELETAL:  2+ Peripheral Pulses, No clubbing, cyanosis, or edema  NEUROLOGY: non-focal  SKIN: No rashes or lesions    ASSESSMENT & PLAN    89 year old female with a PMH of HTN, hypercholesterolemia, remote history of colon cancer, h/o metastatic breast cancer not on any treatment following with dr Garcia and vertigo BIBEMS  after a mechanical fall at home.     # mechanical fall with Minimally displaced acute left posterolateral 10th and 11th rib fractures.  Rest of trauma workup negative  - PT/ rehab   - pain management     # Incidental findings of right pleural effusion with partially loculated adjacent atelectasis  With new moderate ascites in abdomen  Likely maligancy related  Patient is not SOB and has no abdominal discomfort  - per daughter patient doesnt want to  receive any invasive treatment, but they would like to discuss the available modalities with Dr Garcia     # HLD c/w statin  # remote h/o colon cancer: not active     # HTN   on valsartan/ HCTZ hold for now given dizziness and reassess in AM     # Vertigo c/w meclizine     # DASH Diet   # DVT prophylaxis: lovenoox  # DNR/DNI  # Dispo per PT rehab, daughter is requesting  aid

## 2020-05-27 LAB
ANION GAP SERPL CALC-SCNC: 10 MMOL/L — SIGNIFICANT CHANGE UP (ref 7–14)
BUN SERPL-MCNC: 10 MG/DL — SIGNIFICANT CHANGE UP (ref 10–20)
CALCIUM SERPL-MCNC: 9 MG/DL — SIGNIFICANT CHANGE UP (ref 8.5–10.1)
CHLORIDE SERPL-SCNC: 96 MMOL/L — LOW (ref 98–110)
CO2 SERPL-SCNC: 28 MMOL/L — SIGNIFICANT CHANGE UP (ref 17–32)
CREAT SERPL-MCNC: 0.8 MG/DL — SIGNIFICANT CHANGE UP (ref 0.7–1.5)
GLUCOSE SERPL-MCNC: 96 MG/DL — SIGNIFICANT CHANGE UP (ref 70–99)
HCT VFR BLD CALC: 34.3 % — LOW (ref 37–47)
HGB BLD-MCNC: 11.5 G/DL — LOW (ref 12–16)
MCHC RBC-ENTMCNC: 29.4 PG — SIGNIFICANT CHANGE UP (ref 27–31)
MCHC RBC-ENTMCNC: 33.5 G/DL — SIGNIFICANT CHANGE UP (ref 32–37)
MCV RBC AUTO: 87.7 FL — SIGNIFICANT CHANGE UP (ref 81–99)
NRBC # BLD: 0 /100 WBCS — SIGNIFICANT CHANGE UP (ref 0–0)
PLATELET # BLD AUTO: 354 K/UL — SIGNIFICANT CHANGE UP (ref 130–400)
POTASSIUM SERPL-MCNC: 3.7 MMOL/L — SIGNIFICANT CHANGE UP (ref 3.5–5)
POTASSIUM SERPL-SCNC: 3.7 MMOL/L — SIGNIFICANT CHANGE UP (ref 3.5–5)
RBC # BLD: 3.91 M/UL — LOW (ref 4.2–5.4)
RBC # FLD: 14.3 % — SIGNIFICANT CHANGE UP (ref 11.5–14.5)
SODIUM SERPL-SCNC: 134 MMOL/L — LOW (ref 135–146)
WBC # BLD: 6.64 K/UL — SIGNIFICANT CHANGE UP (ref 4.8–10.8)
WBC # FLD AUTO: 6.64 K/UL — SIGNIFICANT CHANGE UP (ref 4.8–10.8)

## 2020-05-27 PROCEDURE — 99231 SBSQ HOSP IP/OBS SF/LOW 25: CPT

## 2020-05-27 RX ORDER — MECLIZINE HCL 12.5 MG
25 TABLET ORAL EVERY 6 HOURS
Refills: 0 | Status: DISCONTINUED | OUTPATIENT
Start: 2020-05-27 | End: 2020-05-29

## 2020-05-27 RX ADMIN — Medication 25 MILLIGRAM(S): at 11:10

## 2020-05-27 RX ADMIN — SIMVASTATIN 40 MILLIGRAM(S): 20 TABLET, FILM COATED ORAL at 21:13

## 2020-05-27 RX ADMIN — Medication 25 MILLIGRAM(S): at 18:14

## 2020-05-27 RX ADMIN — Medication 25 MILLIGRAM(S): at 23:26

## 2020-05-27 RX ADMIN — LIDOCAINE 1 PATCH: 4 CREAM TOPICAL at 23:10

## 2020-05-27 RX ADMIN — LIDOCAINE 1 PATCH: 4 CREAM TOPICAL at 11:10

## 2020-05-27 RX ADMIN — LIDOCAINE 1 PATCH: 4 CREAM TOPICAL at 20:12

## 2020-05-27 RX ADMIN — ENOXAPARIN SODIUM 40 MILLIGRAM(S): 100 INJECTION SUBCUTANEOUS at 11:10

## 2020-05-27 NOTE — CHART NOTE - NSCHARTNOTEFT_GEN_A_CORE
Spoke with pt's daughter in detail about prognosis of disease , on last office visit they were told if there is progression of disease pt could be offered Faslodex or exemestane . But now considering her functional decline ,  age , and comorbidities do not recommend any further cancer directed therapy . Pt's daughter demonstrated understanding , and will like to proceed with Hospice , since pt lives by herself [facility] .  Discussed with medical team , consult for hospice placed.

## 2020-05-27 NOTE — PROGRESS NOTE ADULT - ATTENDING COMMENTS
Patient seen and examined independently. Agree with resident note/ history / physical exam and plan of care with following exceptions/additions/updates. Case discussed with patient/pt decision maker, house-staff and nursing.     Dr [facility] spoke with daughter and answered the questions, the daughter wants hospice eval. needs services at home or NH placement  DNR, DNI

## 2020-05-27 NOTE — OCCUPATIONAL THERAPY INITIAL EVALUATION ADULT - PERSONAL SAFETY AND JUDGMENT, REHAB EVAL
at risk behaviors demonstrated/Although not seen during OT IE, nurses report pt can be impulsive and may try to stand up on her own

## 2020-05-27 NOTE — PROGRESS NOTE ADULT - SUBJECTIVE AND OBJECTIVE BOX
SUBJECTIVE:    Patient is a 89y old Female who presents with a chief complaint of fall (26 May 2020 09:25)    Currently admitted to medicine with the primary diagnosis of Intraabdominal hemorrhage     Today is hospital day 2d. This morning she is resting comfortably in bed and reports no new issues or overnight events.     INCOMPLETE    PAST MEDICAL & SURGICAL HISTORY  Vertigo  High cholesterol  HTN (hypertension)  History of partial colectomy      ALLERGIES:  No Known Allergies    MEDICATIONS:  STANDING MEDICATIONS  chlorhexidine 4% Liquid 1 Application(s) Topical <User Schedule>  enoxaparin Injectable 40 milliGRAM(s) SubCutaneous daily  lidocaine   Patch 1 Patch Transdermal daily  meclizine 25 milliGRAM(s) Oral every 8 hours  pantoprazole    Tablet 40 milliGRAM(s) Oral before breakfast  simvastatin 40 milliGRAM(s) Oral at bedtime    PRN MEDICATIONS  ibuprofen  Tablet. 400 milliGRAM(s) Oral every 6 hours PRN    VITALS:   T(F): 97.1  HR: 95  BP: 159/85  RR: 17  SpO2: 93%    LABS:                        11.5   6.64  )-----------( 354      ( 27 May 2020 06:42 )             34.3     05-26    135  |  96<L>  |  11  ----------------------------<  119<H>  3.7   |  28  |  0.7    Ca    9.0      26 May 2020 06:44    TPro  6.1  /  Alb  2.9<L>  /  TBili  0.9  /  DBili  x   /  AST  33  /  ALT  17  /  AlkPhos  136<H>  05-26              CARDIAC MARKERS ( 25 May 2020 17:24 )  x     / x     / 56 U/L / x     / x          RADIOLOGY:    PHYSICAL EXAM:  GEN: No acute distress  PULM/CHEST: Clear to auscultation bilaterally, no rales, rhonchi or wheezes   CVS: Regular rate and rhythm, S1-S2, no murmurs  ABD: Soft, non-tender, non-distended, +BS  EXT: No edema  NEURO: AAOx3    INCOMPLETE SUBJECTIVE:    Patient is a 89y old Female who presents with a chief complaint of fall (26 May 2020 09:25)    Currently admitted to medicine with the primary diagnosis of Intraabdominal hemorrhage     Today is hospital day 2d. This morning she is resting comfortably in bed and reports no new issues or overnight events. Patient denies any complaints this morning. Feels well otherwise and denied any pain. She was confused, thought she was at home, though pleasant and stated she wanted to go to sleep.    PAST MEDICAL & SURGICAL HISTORY  Vertigo  High cholesterol  HTN (hypertension)  History of partial colectomy      ALLERGIES:  No Known Allergies    MEDICATIONS:  STANDING MEDICATIONS  chlorhexidine 4% Liquid 1 Application(s) Topical <User Schedule>  enoxaparin Injectable 40 milliGRAM(s) SubCutaneous daily  lidocaine   Patch 1 Patch Transdermal daily  meclizine 25 milliGRAM(s) Oral every 8 hours  pantoprazole    Tablet 40 milliGRAM(s) Oral before breakfast  simvastatin 40 milliGRAM(s) Oral at bedtime    PRN MEDICATIONS  ibuprofen  Tablet. 400 milliGRAM(s) Oral every 6 hours PRN    VITALS:   T(F): 97.1  HR: 95  BP: 159/85  RR: 17  SpO2: 93%    LABS:                        11.5   6.64  )-----------( 354      ( 27 May 2020 06:42 )             34.3     05-26    135  |  96<L>  |  11  ----------------------------<  119<H>  3.7   |  28  |  0.7    Ca    9.0      26 May 2020 06:44    TPro  6.1  /  Alb  2.9<L>  /  TBili  0.9  /  DBili  x   /  AST  33  /  ALT  17  /  AlkPhos  136<H>  05-26              CARDIAC MARKERS ( 25 May 2020 17:24 )  x     / x     / 56 U/L / x     / x          RADIOLOGY:    PHYSICAL EXAM:  GEN: No acute distress. Lying comfortably on the bed.  PULM/CHEST: Clear to auscultation bilaterally, no rales, rhonchi or wheezes   CVS: Regular rate and rhythm, S1-S2, no murmurs  ABD: Soft, non-tender, non-distended, +BS  EXT: No edema  NEURO: AAOx1

## 2020-05-27 NOTE — OCCUPATIONAL THERAPY INITIAL EVALUATION ADULT - PLANNED THERAPY INTERVENTIONS, OT EVAL
transfer training/ADL retraining/bed mobility training/strengthening/balance training/cognitive, visual perceptual/ROM/IADL retraining/stretching

## 2020-05-27 NOTE — OCCUPATIONAL THERAPY INITIAL EVALUATION ADULT - PERTINENT HX OF CURRENT PROBLEM, REHAB EVAL
89 year old female with a PMH of HTN, hypercholesterolemia, remote history of colon cancer, h/o metastatic breast cancer not on any treatment following with dr Garcia and vertigo BIBEMS  after fall at home.

## 2020-05-27 NOTE — PROGRESS NOTE ADULT - ASSESSMENT
89 year old female with a PMH of HTN, hypercholesterolemia, remote history of colon cancer, h/o metastatic breast cancer not on any treatment following with dr Garcia and vertigo BIBEMS  after a mechanical fall at home.     # mechanical fall with Minimally displaced acute left posterolateral 10th and 11th rib fractures on CT chest  - Rest of trauma workup negative  - PT/ rehab   - pain management     # Incidental findings of right pleural effusion with partially loculated adjacent atelectasis  - With new moderate ascites in abdomen  - Likely maligancy related  - Patient is not SOB and has no abdominal discomfort  - per daughter patient doesn't want to receive any invasive treatment, but they would like to discuss the available modalities with Dr Garcia   - F/u oncology consult    # HLD c/w statin  # remote h/o colon cancer: not active     # HTN   on valsartan/ HCTZ hold for now given dizziness and reassess in AM     # Vertigo c/w meclizine     # DASH Diet   # DVT prophylaxis: lovenoox  # DNR/DNI  # Dispo per PT rehab, daughter is requesting 24/7 aid though would require expense out of pocket; SNF vs home w/ [facility] 89 year old female with a PMH of HTN, hypercholesterolemia, remote history of colon cancer, h/o metastatic breast cancer not on any treatment following with dr Garcia and vertigo BIBEMS  after a mechanical fall at home.     # mechanical fall with Minimally displaced acute left posterolateral 10th and 11th rib fractures on CT chest  - Rest of trauma workup negative  - PT/ rehab   - pain management     # Incidental findings of right pleural effusion with partially loculated adjacent atelectasis  - With new moderate ascites in abdomen  - Likely maligancy related  - Patient is not SOB and has no abdominal discomfort  - per daughter patient doesn't want to receive any invasive treatment, but they would like to discuss the available modalities with Dr Garcia   - F/u oncology consult    # HLD c/w statin    # remote h/o colon cancer: not active     # HTN   - on valsartan/ HCTZ hold for now given dizziness and reassess in AM     # Vertigo  - c/w meclizine     # DASH Diet   # DVT prophylaxis: lovenoox  # DNR/DNI  # Dispo per PT rehab, daughter is requesting 24/7 aid though would require expense out of pocket; SNF vs home w/ HHA 89 year old female with a PMH of HTN, hypercholesterolemia, remote history of colon cancer, h/o metastatic breast cancer not on any treatment following with dr Garcia and vertigo BIBEMS  after a mechanical fall at home.     # mechanical fall with Minimally displaced acute left posterolateral 10th and 11th rib fractures on CT chest  - Rest of trauma workup negative  - PT/ rehab   - pain management     # Incidental findings of right pleural effusion with partially loculated adjacent atelectasis  - With new moderate ascites in abdomen  - Likely maligancy related  - Patient is not SOB and has no abdominal discomfort  - per daughter patient doesn't want to receive any invasive treatment, but they would like to discuss the available modalities with Dr Garcia   - F/u oncology consult    # HLD c/w statin    # remote h/o colon cancer: not active     # HTN   - on valsartan/ HCTZ hold for now given dizziness and reassess in AM     # Vertigo  - c/w meclizine , increased to q6h from q8h    # DASH Diet   # DVT prophylaxis: lovenoox  # DNR/DNI  # Dispo per PT rehab, daughter is requesting 24/7 aid though would require expense out of pocket; SNF vs home w/ HHA

## 2020-05-27 NOTE — OCCUPATIONAL THERAPY INITIAL EVALUATION ADULT - GENERAL OBSERVATIONS, REHAB EVAL
Pt encountered seated in bedside chair in NAD +chair alarm. Pt left as received in NAD +chair alarm.

## 2020-05-28 ENCOUNTER — TRANSCRIPTION ENCOUNTER (OUTPATIENT)
Age: 85
End: 2020-05-28

## 2020-05-28 LAB
ANION GAP SERPL CALC-SCNC: 12 MMOL/L — SIGNIFICANT CHANGE UP (ref 7–14)
BUN SERPL-MCNC: 13 MG/DL — SIGNIFICANT CHANGE UP (ref 10–20)
CALCIUM SERPL-MCNC: 8.7 MG/DL — SIGNIFICANT CHANGE UP (ref 8.5–10.1)
CHLORIDE SERPL-SCNC: 94 MMOL/L — LOW (ref 98–110)
CO2 SERPL-SCNC: 26 MMOL/L — SIGNIFICANT CHANGE UP (ref 17–32)
CREAT SERPL-MCNC: 0.8 MG/DL — SIGNIFICANT CHANGE UP (ref 0.7–1.5)
GLUCOSE BLDC GLUCOMTR-MCNC: 152 MG/DL — HIGH (ref 70–99)
GLUCOSE SERPL-MCNC: 105 MG/DL — HIGH (ref 70–99)
HCT VFR BLD CALC: 33.5 % — LOW (ref 37–47)
HGB BLD-MCNC: 11.1 G/DL — LOW (ref 12–16)
MCHC RBC-ENTMCNC: 29 PG — SIGNIFICANT CHANGE UP (ref 27–31)
MCHC RBC-ENTMCNC: 33.1 G/DL — SIGNIFICANT CHANGE UP (ref 32–37)
MCV RBC AUTO: 87.5 FL — SIGNIFICANT CHANGE UP (ref 81–99)
NRBC # BLD: 0 /100 WBCS — SIGNIFICANT CHANGE UP (ref 0–0)
PLATELET # BLD AUTO: 357 K/UL — SIGNIFICANT CHANGE UP (ref 130–400)
POTASSIUM SERPL-MCNC: 4.1 MMOL/L — SIGNIFICANT CHANGE UP (ref 3.5–5)
POTASSIUM SERPL-SCNC: 4.1 MMOL/L — SIGNIFICANT CHANGE UP (ref 3.5–5)
RBC # BLD: 3.83 M/UL — LOW (ref 4.2–5.4)
RBC # FLD: 14.6 % — HIGH (ref 11.5–14.5)
SODIUM SERPL-SCNC: 132 MMOL/L — LOW (ref 135–146)
WBC # BLD: 6.9 K/UL — SIGNIFICANT CHANGE UP (ref 4.8–10.8)
WBC # FLD AUTO: 6.9 K/UL — SIGNIFICANT CHANGE UP (ref 4.8–10.8)

## 2020-05-28 PROCEDURE — 99233 SBSQ HOSP IP/OBS HIGH 50: CPT

## 2020-05-28 RX ORDER — LIDOCAINE 4 G/100G
0 CREAM TOPICAL
Qty: 0 | Refills: 0 | DISCHARGE
Start: 2020-05-28

## 2020-05-28 RX ORDER — OMEPRAZOLE 10 MG/1
0 CAPSULE, DELAYED RELEASE ORAL
Qty: 0 | Refills: 0 | DISCHARGE

## 2020-05-28 RX ORDER — ONDANSETRON 8 MG/1
4 TABLET, FILM COATED ORAL ONCE
Refills: 0 | Status: DISCONTINUED | OUTPATIENT
Start: 2020-05-28 | End: 2020-05-29

## 2020-05-28 RX ORDER — IBUPROFEN 200 MG
1 TABLET ORAL
Qty: 0 | Refills: 0 | DISCHARGE
Start: 2020-05-28

## 2020-05-28 RX ORDER — HALOPERIDOL DECANOATE 100 MG/ML
2.5 INJECTION INTRAMUSCULAR ONCE
Refills: 0 | Status: COMPLETED | OUTPATIENT
Start: 2020-05-28 | End: 2020-05-28

## 2020-05-28 RX ADMIN — SIMVASTATIN 40 MILLIGRAM(S): 20 TABLET, FILM COATED ORAL at 22:39

## 2020-05-28 RX ADMIN — Medication 25 MILLIGRAM(S): at 05:22

## 2020-05-28 RX ADMIN — PANTOPRAZOLE SODIUM 40 MILLIGRAM(S): 20 TABLET, DELAYED RELEASE ORAL at 05:23

## 2020-05-28 RX ADMIN — CHLORHEXIDINE GLUCONATE 1 APPLICATION(S): 213 SOLUTION TOPICAL at 05:23

## 2020-05-28 RX ADMIN — Medication 25 MILLIGRAM(S): at 11:01

## 2020-05-28 RX ADMIN — Medication 25 MILLIGRAM(S): at 22:39

## 2020-05-28 RX ADMIN — HALOPERIDOL DECANOATE 2.5 MILLIGRAM(S): 100 INJECTION INTRAMUSCULAR at 20:58

## 2020-05-28 RX ADMIN — ENOXAPARIN SODIUM 40 MILLIGRAM(S): 100 INJECTION SUBCUTANEOUS at 11:01

## 2020-05-28 RX ADMIN — Medication 25 MILLIGRAM(S): at 17:43

## 2020-05-28 RX ADMIN — LIDOCAINE 1 PATCH: 4 CREAM TOPICAL at 11:00

## 2020-05-28 RX ADMIN — Medication 400 MILLIGRAM(S): at 12:59

## 2020-05-28 NOTE — DISCHARGE NOTE PROVIDER - NSDCMRMEDTOKEN_GEN_ALL_CORE_FT
aspirin 81 mg oral tablet, chewable: 1 tab(s) orally once a day  meclizine 12.5 mg oral tablet: 2 tab(s) orally once a day  meclizine 25 mg oral tablet: 1 tab(s) orally every 12 hours  omeprazole 20 mg oral delayed release capsule: orally 2 times a day  simvastatin 40 mg oral tablet: 1 tab(s) orally once a day (at bedtime)  valsartan-hydrochlorothiazide 160mg-12.5mg oral tablet: 1 tab(s) orally once a day aspirin 81 mg oral tablet, chewable: 1 tab(s) orally once a day  ibuprofen 400 mg oral tablet: 1 tab(s) orally every 6 hours, As needed, Mild Pain (1 - 3)  lidocaine 5% topical film:  topically   meclizine 12.5 mg oral tablet: 2 tab(s) orally once a day  simvastatin 40 mg oral tablet: 1 tab(s) orally once a day (at bedtime)  valsartan-hydrochlorothiazide 160mg-12.5mg oral tablet: 1 tab(s) orally once a day

## 2020-05-28 NOTE — DISCHARGE NOTE PROVIDER - CARE PROVIDER_API CALL
Sai Sterling)  Family Medicine  [address] [place] [address]  Phone: [phone]  Fax: [phone]  Follow Up Time: 1 week

## 2020-05-28 NOTE — DISCHARGE NOTE PROVIDER - NSDCCPCAREPLAN_GEN_ALL_CORE_FT
PRINCIPAL DISCHARGE DIAGNOSIS  Diagnosis: Rib fracture  Assessment and Plan of Treatment: No further surgical interventions needed. Take medications to control pain as needed.      SECONDARY DISCHARGE DIAGNOSES  Diagnosis: Metastatic breast cancer  Assessment and Plan of Treatment: New moderate ascites and right pleural effusion was found on imaging. Due to multiple comorbidities and age, heme/onc recommended no further cancer directed therapy.

## 2020-05-28 NOTE — PROGRESS NOTE ADULT - ATTENDING COMMENTS
Patient seen and examined independently. Agree with resident note/ history / physical exam and plan of care with following exceptions/additions/updates. Case discussed with patient/pt decision maker, house-staff and nursing.     pt has mild nausea today, and has poor appetite, will give her reglan 5 mg  dispo: dc to hospice in [place] in am   DNR DNI

## 2020-05-28 NOTE — DISCHARGE NOTE PROVIDER - HOSPITAL COURSE
HPI:    89 year old female with a PMH of HTN, hypercholesterolemia, remote history of colon cancer, h/o metastatic breast cancer not on any treatment following with dr Garcia and vertigo BIBEMS  after fall at home.     patient tripped while she was walking to the bathroom, she pressed her alert button, she was able to stand up and walk to her bed after she fell, fall unwitnessed, now she complaints of left side rib pain and back pain.  denies LOC, or head trauma.  denies any nausea or other preceding symptoms. per daughter she has been complaining of dizziness in the last few month. never lost consciousness.     denies chest pain, sob, abdominal pain, neck pain, dizziness, headaches, numbness, or tingling.    lives in a two family house on the first floor with her daughter.    Patient lives by self and usually uses a cane to walk.        In ED Vital Signs     T(F): 98.4    HR: 99     BP: 128/71    RR: 18     SpO2: 98%     trauma work up was negative except of Minimally displaced acute left posterolateral 10th and 11th rib fractures, and  Incidentally found Right moderate effusion, partially loculated adjacent atelectasis and New moderate volume ascites. (25 May 2020 13:06)        Patient was seen by trauma surgery, recommended no further intervention.    Dr. Garcia spoke with daughter and due to patient's age and comorbidities, recommended no further invasive or cancer directed therapy.    Hospice was consulted. HPI:    89 year old female with a PMH of HTN, hypercholesterolemia, remote history of colon cancer, h/o metastatic breast cancer not on any treatment following with dr Garcia and vertigo BIBEMS  after fall at home.     patient tripped while she was walking to the bathroom, she pressed her alert button, she was able to stand up and walk to her bed after she fell, fall unwitnessed, now she complaints of left side rib pain and back pain.  denies LOC, or head trauma.  denies any nausea or other preceding symptoms. per daughter she has been complaining of dizziness in the last few month. never lost consciousness.     denies chest pain, sob, abdominal pain, neck pain, dizziness, headaches, numbness, or tingling.    lives in a two family house on the first floor with her daughter.    Patient lives by self and usually uses a cane to walk.        In ED Vital Signs     T(F): 98.4    HR: 99     BP: 128/71    RR: 18     SpO2: 98%     trauma work up was negative except of Minimally displaced acute left posterolateral 10th and 11th rib fractures, and  Incidentally found Right moderate effusion, partially loculated adjacent atelectasis and New moderate volume ascites. (25 May 2020 13:06)        Patient was seen by trauma surgery, recommended no further intervention.    Dr. Garcia spoke with daughter and due to patient's age and comorbidities, recommended no further invasive or cancer directed therapy.    Hospice was consulted. She is approved for discharge to [facility].

## 2020-05-28 NOTE — PROGRESS NOTE ADULT - SUBJECTIVE AND OBJECTIVE BOX
SUBJECTIVE:    Patient is a 89y old Female who presents with a chief complaint of fall (27 May 2020 07:21)    Currently admitted to medicine with the primary diagnosis of Intraabdominal hemorrhage     Today is hospital day 3d. This morning she is resting comfortably in bed and reports no new issues or overnight events.   INCOMPLETE    PAST MEDICAL & SURGICAL HISTORY  Vertigo  High cholesterol  HTN (hypertension)  History of partial colectomy      ALLERGIES:  No Known Allergies    MEDICATIONS:  STANDING MEDICATIONS  chlorhexidine 4% Liquid 1 Application(s) Topical <User Schedule>  enoxaparin Injectable 40 milliGRAM(s) SubCutaneous daily  lidocaine   Patch 1 Patch Transdermal daily  meclizine 25 milliGRAM(s) Oral every 6 hours  pantoprazole    Tablet 40 milliGRAM(s) Oral before breakfast  simvastatin 40 milliGRAM(s) Oral at bedtime    PRN MEDICATIONS  ibuprofen  Tablet. 400 milliGRAM(s) Oral every 6 hours PRN    VITALS:   T(F): 96.7  HR: 85  BP: 111/57  RR: 17  SpO2: 92%    LABS:                        11.1   6.90  )-----------( 357      ( 28 May 2020 05:38 )             33.5     05-27    134<L>  |  96<L>  |  10  ----------------------------<  96  3.7   |  28  |  0.8    Ca    9.0      27 May 2020 06:42                    RADIOLOGY:    PHYSICAL EXAM:  GEN: No acute distress. Lying comfortably on the bed.  PULM/CHEST: Clear to auscultation bilaterally, no rales, rhonchi or wheezes   CVS: Regular rate and rhythm, S1-S2, no murmurs  ABD: Soft, non-tender, non-distended, +BS  EXT: No edema  NEURO: AAOx1 SUBJECTIVE:    Patient is a 89y old Female who presents with a chief complaint of fall (27 May 2020 07:21)    Currently admitted to medicine with the primary diagnosis of Intraabdominal hemorrhage     Today is hospital day 3d. This morning she is resting comfortably in bed and reports no new issues or overnight events. Patient denies any complaints this morning though wanting to go home. States she wants to see her daughter or for her daughter to visit her. Patient is confused, stating last thing she remembers is that she fell, believes she recently got to the hospital yesterday, was unaware of rib fractures. Otherwise denied any pain, fevers, chills, SOB, chest pain, cough, abdominal pain.    PAST MEDICAL & SURGICAL HISTORY  Vertigo  High cholesterol  HTN (hypertension)  History of partial colectomy      ALLERGIES:  No Known Allergies    MEDICATIONS:  STANDING MEDICATIONS  chlorhexidine 4% Liquid 1 Application(s) Topical <User Schedule>  enoxaparin Injectable 40 milliGRAM(s) SubCutaneous daily  lidocaine   Patch 1 Patch Transdermal daily  meclizine 25 milliGRAM(s) Oral every 6 hours  pantoprazole    Tablet 40 milliGRAM(s) Oral before breakfast  simvastatin 40 milliGRAM(s) Oral at bedtime    PRN MEDICATIONS  ibuprofen  Tablet. 400 milliGRAM(s) Oral every 6 hours PRN    VITALS:   T(F): 96.7  HR: 85  BP: 111/57  RR: 17  SpO2: 92%    LABS:                        11.1   6.90  )-----------( 357      ( 28 May 2020 05:38 )             33.5     05-27    134<L>  |  96<L>  |  10  ----------------------------<  96  3.7   |  28  |  0.8    Ca    9.0      27 May 2020 06:42                    RADIOLOGY:    PHYSICAL EXAM:  GEN: No acute distress. Lying comfortably on the bed.  PULM/CHEST: Clear to auscultation bilaterally, no rales, rhonchi or wheezes   CVS: Regular rate and rhythm, S1-S2, no murmurs  ABD: Soft, non-tender, non-distended, +BS  EXT: No edema  NEURO: AAOx2 (oriented to self and place)

## 2020-05-28 NOTE — PROGRESS NOTE ADULT - ASSESSMENT
89 year old female with a PMH of HTN, hypercholesterolemia, remote history of colon cancer, h/o metastatic breast cancer not on any treatment following with dr Gacria and vertigo BIBEMS  after a mechanical fall at home.     # mechanical fall with Minimally displaced acute left posterolateral 10th and 11th rib fractures on CT chest  - Rest of trauma workup negative  - PT/ rehab   - pain management     # Incidental findings of right pleural effusion with partially loculated adjacent atelectasis  - With new moderate ascites in abdomen  - Likely maligancy related  - Patient is not SOB and has no abdominal discomfort  - Per daughter patient doesn't want to receive any invasive treatment, but they would like to discuss the available modalities with Dr Garcia   - In consideration of her age and comorbidities, no further cancer directed therapy recommended by heme/onc  - F/u hospice consult    # HLD c/w statin    # remote h/o colon cancer: not active     # HTN   - on valsartan/ HCTZ hold for now given dizziness and reassess in AM     # Vertigo  - c/w meclizine, increased to q6h from q8h    # DASH Diet   # DVT prophylaxis: lovenoox  # DNR/DNI  # Dispo:  f/u hospice consult SNF vs home w/ HHA 89 year old female with a PMH of HTN, hypercholesterolemia, remote history of colon cancer, h/o metastatic breast cancer not on any treatment following with dr Garcia and vertigo BIBEMS  after a mechanical fall at home.     # mechanical fall with Minimally displaced acute left posterolateral 10th and 11th rib fractures on CT chest  - Rest of trauma workup negative  - PT/ rehab   - pain management     # Incidental findings of right pleural effusion with partially loculated adjacent atelectasis  - With new moderate ascites in abdomen  - Likely maligancy related  - Patient is not SOB and has no abdominal discomfort  - Per daughter patient doesn't want to receive any invasive treatment, but they would like to discuss the available modalities with Dr Garcia   - In consideration of her age and comorbidities, no further cancer directed therapy recommended by heme/onc  - F/u hospice consult    # HLD c/w statin    # remote h/o colon cancer: not active     # HTN   - on valsartan/ HCTZ hold for now given dizziness and reassess in AM     # Vertigo  - c/w meclizine, increased to q6h from q8h    # DASH Diet   # DVT prophylaxis: lovenoox  # DNR/DNI  # Dispo:  f/u hospice consult SNF vs home w/ HHA 89 year old female with a PMH of HTN, hypercholesterolemia, remote history of colon cancer, h/o metastatic breast cancer not on any treatment following with dr Garcia and vertigo BIBEMS  after a mechanical fall at home.     # mechanical fall with Minimally displaced acute left posterolateral 10th and 11th rib fractures on CT chest  - Rest of trauma workup negative  - PT/ rehab   - pain management     # Incidental findings of right pleural effusion with partially loculated adjacent atelectasis  - With new moderate ascites in abdomen  - Likely maligancy related  - Patient is not SOB and has no abdominal discomfort  - Per daughter patient doesn't want to receive any invasive treatment, but they would like to discuss the available modalities with Dr Garcia   - In consideration of her age and comorbidities, no further cancer directed therapy recommended by heme/onc  - F/u hospice consult  - Will send COVID swab for placement    # HLD c/w statin    # remote h/o colon cancer: not active     # HTN   - on valsartan/ HCTZ hold for now given dizziness and reassess in AM     # Vertigo  - c/w meclizine, increased to q6h from q8h    # DASH Diet   # DVT prophylaxis: lovenoox  # DNR/DNI  # Dispo:  f/u hospice consult SNF vs home w/ HHA 89 year old female with a PMH of HTN, hypercholesterolemia, remote history of colon cancer, h/o metastatic breast cancer not on any treatment following with dr Garcia and vertigo BIBEMS  after a mechanical fall at home.     # mechanical fall with Minimally displaced acute left posterolateral 10th and 11th rib fractures on CT chest  - Rest of trauma workup negative  - PT/ rehab   - pain management     # Incidental findings of right pleural effusion with partially loculated adjacent atelectasis  - With new moderate ascites in abdomen  - Likely maligancy related  - Patient is not SOB and has no abdominal discomfort  - Per daughter patient doesn't want to receive any invasive treatment, but they would like to discuss the available modalities with Dr Garcia   - In consideration of her age and comorbidities, no further cancer directed therapy recommended by heme/onc  - Patient to be discharged tomorrow 5/29/20 at 9am to [place] for hospice    # HLD c/w statin    # remote h/o colon cancer: not active     # HTN   - on valsartan/ HCTZ hold for now given dizziness and reassess in AM     # Vertigo  - c/w meclizine, increased to q6h from q8h    # DASH Diet   # DVT prophylaxis: lovenoox  # DNR/DNI  # Dispo: Patient to be discharged tomorrow 5/29/20 at 9am to [place] for hospice

## 2020-05-29 ENCOUNTER — OUTPATIENT (OUTPATIENT)
Dept: OUTPATIENT SERVICES | Facility: HOSPITAL | Age: 85
LOS: 1 days | End: 2020-05-29

## 2020-05-29 ENCOUNTER — TRANSCRIPTION ENCOUNTER (OUTPATIENT)
Age: 85
End: 2020-05-29

## 2020-05-29 VITALS
DIASTOLIC BLOOD PRESSURE: 63 MMHG | SYSTOLIC BLOOD PRESSURE: 117 MMHG | RESPIRATION RATE: 18 BRPM | HEART RATE: 88 BPM | TEMPERATURE: 96 F

## 2020-05-29 DIAGNOSIS — Z90.49 ACQUIRED ABSENCE OF OTHER SPECIFIED PARTS OF DIGESTIVE TRACT: Chronic | ICD-10-CM

## 2020-05-29 LAB — SARS-COV-2 RNA SPEC QL NAA+PROBE: SIGNIFICANT CHANGE UP

## 2020-05-29 PROCEDURE — 99231 SBSQ HOSP IP/OBS SF/LOW 25: CPT

## 2020-05-29 RX ADMIN — PANTOPRAZOLE SODIUM 40 MILLIGRAM(S): 20 TABLET, DELAYED RELEASE ORAL at 05:35

## 2020-05-29 RX ADMIN — Medication 25 MILLIGRAM(S): at 05:35

## 2020-05-29 NOTE — PROGRESS NOTE ADULT - SUBJECTIVE AND OBJECTIVE BOX
89y old Female who presents with a chief complaint of fall ,  admitted to medicine, pt has extensive PMH, family agreed for hospice.  Today pt is comfortable pleasant, confused, denies any complaints.      PAST MEDICAL & SURGICAL HISTORY  Vertigo  High cholesterol  HTN (hypertension)  History of partial colectomy      ALLERGIES:  No Known Allergies    VITALS:   T(C): 35.7 (29 May 2020 05:00), Max: 36.1 (28 May 2020 20:55)  T(F): 96.3 (29 May 2020 05:00), Max: 96.9 (28 May 2020 20:55)  HR: 88 (29 May 2020 05:00) (88 - 91)  BP: 117/63 (29 May 2020 05:00) (117/63 - 134/64)  BP(mean): --  RR: 18 (29 May 2020 05:00) (18 - 19)      PHYSICAL EXAM:  GEN: No acute distress, pleasant, confused   PULM/CHEST: Clear to auscultation bilaterally, no rales, rhonchi or wheezes   CVS: Regular rate and rhythm, S1-S2, no murmurs  ABD: Soft, non-tender, non-distended, +BS  EXT: No edema  NEURO: AAOx2 (oriented to self and place), no focal neuro deficit     LABS:                                   11.1   6.90  )-----------( 357      ( 28 May 2020 05:38 )             33.5   05-28    132<L>  |  94<L>  |  13  ----------------------------<  105<H>  4.1   |  26  |  0.8    Ca    8.7      28 May 2020 05:38    RADIOLOGY:    < from: CT Chest w/ IV Cont (05.25.20 @ 09:04) >  IMPRESSION:     Minimally displaced acute left posterolateral 10th and 11th rib fractures.    Multiple incisional ventral abdominal wall hernias containing fluid, mesenteric fat and small bowel loops. The small bowel loops appear nonobstructed; However, examination is   limited secondary to lack of oral contrast. Correlation for signs and symptoms of incarceration is suggested.    New moderate volume ascites.    Right moderate effusion, partially loculated adjacent atelectasis.       MEDICATIONS  (STANDING):  chlorhexidine 4% Liquid 1 Application(s) Topical <User Schedule>  enoxaparin Injectable 40 milliGRAM(s) SubCutaneous daily  lidocaine   Patch 1 Patch Transdermal daily  meclizine 25 milliGRAM(s) Oral every 6 hours  pantoprazole    Tablet 40 milliGRAM(s) Oral before breakfast  simvastatin 40 milliGRAM(s) Oral at bedtime    MEDICATIONS  (PRN):  ibuprofen  Tablet. 400 milliGRAM(s) Oral every 6 hours PRN Mild Pain (1 - 3)  ondansetron Injectable 4 milliGRAM(s) IV Push once PRN Nausea and/or Vomiting

## 2020-05-29 NOTE — PROGRESS NOTE ADULT - ASSESSMENT
89 year old female with a PMH of HTN, hypercholesterolemia, remote history of colon cancer, h/o metastatic breast cancer not on any treatment following with dr Garcia and vertigo BIBEMS  after a mechanical fall at home.       A/P   # mechanical fall with Minimally displaced acute left posterolateral 10th and 11th rib fractures   - fall precautions   - trauma workup negative  - PT/ rehab   - pain management     #  right pleural effusion/ new moderate ascites   - Likely malignant ( pt has a h/o breast CA)  - pt is comfortable on RA   - Per daughter patient doesn't want to receive any invasive treatment, but they would like to discuss the available modalities with Dr Garcia   -  no further cancer directed therapy recommended by heme/onc      # HLD  - c/w statin    # remote h/o colon cancer:  - in remission      # HTN   - on valsartan/ HCTZ held     # Vertigo  - c/w meclizine,  q6h from q8h    # DASH Diet   # DVT prophylaxis: lovenoox  # DNR/DNI  # Dispo: Patient is stable for discharge today  to [facility] with hospice care

## 2020-05-29 NOTE — CHART NOTE - NSCHARTNOTEFT_GEN_A_CORE
<<<RESIDENT DISCHARGE NOTE>>>     VIDA CORTES  [id number]    VITAL SIGNS:  T(F): 96.3 (05-29-20 @ 05:00), Max: 97.6 (05-28-20 @ 13:06)  HR: 88 (05-29-20 @ 05:00)  BP: 117/63 (05-29-20 @ 05:00)  SpO2: --    Physical Exam:  GEN: No acute distress. Lying comfortably on the bed.  PULM/CHEST: Clear to auscultation bilaterally, no rales, rhonchi or wheezes   CVS: Regular rate and rhythm, S1-S2, no murmurs  ABD: Soft, non-tender, non-distended, +BS  EXT: No edema  NEURO: AAOx2 (oriented to self and place)      TEST RESULTS:                        11.1   6.90  )-----------( 357      ( 28 May 2020 05:38 )             33.5       05-28    132<L>  |  94<L>  |  13  ----------------------------<  105<H>  4.1   |  26  |  0.8    Ca    8.7      28 May 2020 05:38        FINAL DISCHARGE INTERVIEW:  Resident(s) Present: (Name:___Dr. Mitchell____)    DISCHARGE MEDICATION RECONCILIATION  reviewed with Attending (Name:___Dr. Lorenzo__)    DISPOSITION:   [  ] Home,    [  ] Home with Visiting Nursing Services,   [    ]  SNF/ NH,    [   ] Acute Rehab (4A),   [  X  ] Other (Specify:__[facility]___)

## 2020-05-29 NOTE — DISCHARGE NOTE NURSING/CASE MANAGEMENT/SOCIAL WORK - PATIENT PORTAL LINK FT
You can access the FollowMyHealth Patient Portal offered by [facility] by registering at the following website: [email or web address]. By joining [facility]’s FollowMyHealth portal, you will also be able to view your health information using other applications (apps) compatible with our system.

## 2020-06-02 DIAGNOSIS — Z66 DO NOT RESUSCITATE: ICD-10-CM

## 2020-06-02 DIAGNOSIS — J98.11 ATELECTASIS: ICD-10-CM

## 2020-06-02 DIAGNOSIS — Z85.038 PERSONAL HISTORY OF OTHER MALIGNANT NEOPLASM OF LARGE INTESTINE: ICD-10-CM

## 2020-06-02 DIAGNOSIS — R58 HEMORRHAGE, NOT ELSEWHERE CLASSIFIED: ICD-10-CM

## 2020-06-02 DIAGNOSIS — I10 ESSENTIAL (PRIMARY) HYPERTENSION: ICD-10-CM

## 2020-06-02 DIAGNOSIS — E78.5 HYPERLIPIDEMIA, UNSPECIFIED: ICD-10-CM

## 2020-06-02 DIAGNOSIS — Z51.5 ENCOUNTER FOR PALLIATIVE CARE: ICD-10-CM

## 2020-06-02 DIAGNOSIS — R31.0 GROSS HEMATURIA: ICD-10-CM
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
## 2020-06-02 DIAGNOSIS — J90 PLEURAL EFFUSION, NOT ELSEWHERE CLASSIFIED: ICD-10-CM

## 2020-06-02 DIAGNOSIS — E78.00 PURE HYPERCHOLESTEROLEMIA, UNSPECIFIED: ICD-10-CM
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
## 2020-06-02 DIAGNOSIS — R18.8 OTHER ASCITES: ICD-10-CM

## 2020-06-02 DIAGNOSIS — S22.42XA MULTIPLE FRACTURES OF RIBS, LEFT SIDE, INITIAL ENCOUNTER FOR CLOSED FRACTURE: ICD-10-CM

## 2020-06-02 DIAGNOSIS — C50.919 MALIGNANT NEOPLASM OF UNSPECIFIED SITE OF UNSPECIFIED FEMALE BREAST: ICD-10-CM

## 2020-06-02 DIAGNOSIS — Z53.8 PROCEDURE AND TREATMENT NOT CARRIED OUT FOR OTHER REASONS: ICD-10-CM

## 2020-06-02 DIAGNOSIS — R42 DIZZINESS AND GIDDINESS: ICD-10-CM

## 2020-06-10 DIAGNOSIS — C50.919 MALIGNANT NEOPLASM OF UNSPECIFIED SITE OF UNSPECIFIED FEMALE BREAST: ICD-10-CM

## 2021-05-11 NOTE — ED ADULT NURSE NOTE - NSFALLRSKINDICTYPE_ED_ALL_ED
Lm informing pt ortho ordered mri so they should have gotten the referral, she should contact their office   Impaired Gait

## 2021-10-06 PROBLEM — I10 ESSENTIAL HYPERTENSION: Status: ACTIVE | Noted: 2019-02-14

## 2022-11-03 NOTE — ED PROCEDURE NOTE - NS ED PROC PERFORMED BY1 FT
Ayah Cali is requesting a refill on the following medication(s):  Requested Prescriptions     Pending Prescriptions Disp Refills    hyoscyamine (LEVSIN/SL) 125 MCG sublingual tablet 30 tablet 0     Sig: Place 1 tablet under the tongue every 4 hours as needed for Cramping       Last Visit Date (If Applicable):  42/20/1186    Next Visit Date:    Visit date not found
From: Ayah Sees  To: Sanjay Radha  Sent: 2022 3:27 PM EDT  Subject: Medication    I was wondering if i could have a refill of my Hyoscyamine?  My prescription  2022    Thank you   Ayah Sees
Amadou Steele

## 2022-11-25 NOTE — ED ADULT NURSE NOTE - PERIPHERAL VASCULAR
WDL Winlevi Pregnancy And Lactation Text: This medication is considered safe during pregnancy and breastfeeding.

## 2025-03-06 NOTE — PHYSICAL THERAPY INITIAL EVALUATION ADULT - IMPAIRMENTS FOUND, PT EVAL
rolling walker gait, locomotion, and balance/posture/aerobic capacity/endurance/ergonomics and body mechanics/muscle strength